# Patient Record
Sex: FEMALE | Race: ASIAN | Employment: UNEMPLOYED | URBAN - METROPOLITAN AREA
[De-identification: names, ages, dates, MRNs, and addresses within clinical notes are randomized per-mention and may not be internally consistent; named-entity substitution may affect disease eponyms.]

---

## 2017-03-15 ENCOUNTER — PATIENT MESSAGE (OUTPATIENT)
Dept: FAMILY MEDICINE CLINIC | Facility: CLINIC | Age: 32
End: 2017-03-15

## 2017-03-16 ENCOUNTER — APPOINTMENT (OUTPATIENT)
Dept: LAB | Age: 32
End: 2017-03-16
Attending: FAMILY MEDICINE
Payer: COMMERCIAL

## 2017-03-16 DIAGNOSIS — R89.9 ABNORMAL LABORATORY TEST RESULT: ICD-10-CM

## 2017-03-16 DIAGNOSIS — E03.9 HYPOTHYROIDISM, UNSPECIFIED TYPE: ICD-10-CM

## 2017-03-16 LAB
ALBUMIN SERPL-MCNC: 3.4 G/DL (ref 3.5–4.8)
ALP LIVER SERPL-CCNC: 91 U/L (ref 37–98)
ALT SERPL-CCNC: 21 U/L (ref 14–54)
AST SERPL-CCNC: 12 U/L (ref 15–41)
BILIRUB SERPL-MCNC: 0.3 MG/DL (ref 0.1–2)
BUN BLD-MCNC: 7 MG/DL (ref 8–20)
CALCIUM BLD-MCNC: 9.1 MG/DL (ref 8.3–10.3)
CHLORIDE: 106 MMOL/L (ref 101–111)
CO2: 26 MMOL/L (ref 22–32)
CREAT BLD-MCNC: 0.71 MG/DL (ref 0.55–1.02)
FREE T4: 1.4 NG/DL (ref 0.9–1.8)
GLUCOSE BLD-MCNC: 67 MG/DL (ref 70–99)
M PROTEIN MFR SERPL ELPH: 7.8 G/DL (ref 6.1–8.3)
POTASSIUM SERPL-SCNC: 3.5 MMOL/L (ref 3.6–5.1)
SODIUM SERPL-SCNC: 139 MMOL/L (ref 136–144)
TSI SER-ACNC: 0.16 MIU/ML (ref 0.35–5.5)

## 2017-03-16 PROCEDURE — 84439 ASSAY OF FREE THYROXINE: CPT

## 2017-03-16 PROCEDURE — 80053 COMPREHEN METABOLIC PANEL: CPT

## 2017-03-16 PROCEDURE — 36415 COLL VENOUS BLD VENIPUNCTURE: CPT

## 2017-03-16 PROCEDURE — 84443 ASSAY THYROID STIM HORMONE: CPT

## 2017-03-16 NOTE — TELEPHONE ENCOUNTER
From: Anna Slater  To: Julita Clayton MD  Sent: 3/15/2017 9:51 PM CDT  Subject: Prescription Question    Hi Doctor,  My prescription (levothyroxine) is due for refill next week. Should I be going for blood work?  [facility] k[facility]

## 2017-03-18 ENCOUNTER — PATIENT MESSAGE (OUTPATIENT)
Dept: FAMILY MEDICINE CLINIC | Facility: CLINIC | Age: 32
End: 2017-03-18

## 2017-03-18 DIAGNOSIS — E03.9 HYPOTHYROIDISM, UNSPECIFIED TYPE: Primary | ICD-10-CM

## 2017-03-18 DIAGNOSIS — Z79.899 ENCOUNTER FOR LONG-TERM (CURRENT) USE OF MEDICATIONS: ICD-10-CM

## 2017-03-20 RX ORDER — LEVOTHYROXINE SODIUM 88 UG/1
88 TABLET ORAL
Qty: 30 TABLET | Refills: 1 | Status: SHIPPED | OUTPATIENT
Start: 2017-03-20 | End: 2017-05-11

## 2017-03-20 NOTE — TELEPHONE ENCOUNTER
Ref Range 3/16/17 12:19 PM       Free T4 0.9-1.8 ng/dL 1.4     TSH 0.350-5.500 mIU/mL 0.156 (L)            Dr Benny Polk, adjustments needed?  Pt taking levothyroxine 100 mcg QD

## 2017-03-20 NOTE — TELEPHONE ENCOUNTER
From: Alisa Irwin  To: Pedro Meyers MD  Sent: 3/18/2017 10:00 AM CDT  Subject: Prescription Question    Hi, I ran out of levothyroxine today. My lab results are due only on Monday.  Will you please help with emergency prescription for two d

## 2017-05-10 ENCOUNTER — APPOINTMENT (OUTPATIENT)
Dept: LAB | Age: 32
End: 2017-05-10
Attending: FAMILY MEDICINE
Payer: COMMERCIAL

## 2017-05-10 DIAGNOSIS — Z79.899 ENCOUNTER FOR LONG-TERM (CURRENT) USE OF MEDICATIONS: ICD-10-CM

## 2017-05-10 DIAGNOSIS — E03.9 HYPOTHYROIDISM, UNSPECIFIED TYPE: ICD-10-CM

## 2017-05-10 PROCEDURE — 84443 ASSAY THYROID STIM HORMONE: CPT | Performed by: FAMILY MEDICINE

## 2017-05-10 PROCEDURE — 84439 ASSAY OF FREE THYROXINE: CPT | Performed by: FAMILY MEDICINE

## 2017-05-10 PROCEDURE — 36415 COLL VENOUS BLD VENIPUNCTURE: CPT | Performed by: FAMILY MEDICINE

## 2017-08-11 ENCOUNTER — PATIENT MESSAGE (OUTPATIENT)
Dept: FAMILY MEDICINE CLINIC | Facility: CLINIC | Age: 32
End: 2017-08-11

## 2017-08-11 NOTE — TELEPHONE ENCOUNTER
From: Sal Chou  To: Andrew Gaitan MD  Sent: 8/11/2017 5:31 AM CDT  Subject: Prescription Question    My prescription is running out in a week. Should I have to go for lab test first? Please advice. Thank you!

## 2017-11-06 ENCOUNTER — APPOINTMENT (OUTPATIENT)
Dept: LAB | Age: 32
End: 2017-11-06
Attending: FAMILY MEDICINE
Payer: COMMERCIAL

## 2017-11-06 DIAGNOSIS — E03.9 HYPOTHYROIDISM, UNSPECIFIED TYPE: ICD-10-CM

## 2017-11-06 PROCEDURE — 36415 COLL VENOUS BLD VENIPUNCTURE: CPT | Performed by: FAMILY MEDICINE

## 2017-11-06 PROCEDURE — 84443 ASSAY THYROID STIM HORMONE: CPT | Performed by: FAMILY MEDICINE

## 2017-11-06 PROCEDURE — 84439 ASSAY OF FREE THYROXINE: CPT | Performed by: FAMILY MEDICINE

## 2017-11-08 ENCOUNTER — OFFICE VISIT (OUTPATIENT)
Dept: FAMILY MEDICINE CLINIC | Facility: CLINIC | Age: 32
End: 2017-11-08

## 2017-11-08 VITALS
HEIGHT: 62.5 IN | DIASTOLIC BLOOD PRESSURE: 60 MMHG | BODY MASS INDEX: 26.96 KG/M2 | TEMPERATURE: 98 F | WEIGHT: 150.25 LBS | HEART RATE: 75 BPM | RESPIRATION RATE: 16 BRPM | SYSTOLIC BLOOD PRESSURE: 106 MMHG

## 2017-11-08 DIAGNOSIS — N92.1 MENORRHAGIA WITH IRREGULAR CYCLE: ICD-10-CM

## 2017-11-08 DIAGNOSIS — E03.9 HYPOTHYROIDISM, UNSPECIFIED TYPE: Primary | ICD-10-CM

## 2017-11-08 DIAGNOSIS — E28.2 PCOS (POLYCYSTIC OVARIAN SYNDROME): ICD-10-CM

## 2017-11-08 DIAGNOSIS — N92.6 IRREGULAR MENSTRUATION: ICD-10-CM

## 2017-11-08 PROCEDURE — 99213 OFFICE O/P EST LOW 20 MIN: CPT | Performed by: FAMILY MEDICINE

## 2017-11-08 PROCEDURE — 90471 IMMUNIZATION ADMIN: CPT | Performed by: FAMILY MEDICINE

## 2017-11-08 PROCEDURE — 90686 IIV4 VACC NO PRSV 0.5 ML IM: CPT | Performed by: FAMILY MEDICINE

## 2017-11-08 RX ORDER — LEVOTHYROXINE SODIUM 88 UG/1
88 TABLET ORAL
Qty: 90 TABLET | Refills: 3 | Status: SHIPPED | OUTPATIENT
Start: 2017-11-08 | End: 2018-12-11

## 2017-11-08 NOTE — PATIENT INSTRUCTIONS
Birth Control: The Pill    Birth control pills contain hormones that help prevent pregnancy. The pills are prescribed by your healthcare provider. There are many types of birth control pills available.  If you have side effects from one type of pill, tell In these cases, discuss the risks with your healthcare provider. Date Last Reviewed: 3/1/2017  © 1675-9113 [employer]. [address] [facility], [address] [place] [address]. All rights reserved.  This information is not intended as a substitute for prof

## 2017-11-08 NOTE — PROGRESS NOTES
Ap Cristobal is a 27year old female. Patient presents with:  Test Results: Discuss results. HPI:   Hypothyroidism: Patient is seen for follow-up and medication refill.   States did have her blood work done and would like to discuss her result auscultation  CARDIO: RRR without murmur  GI: good BS's,no masses, HSM or tenderness  EXTREMITIES: no cyanosis, clubbing or edema  NEURO: DTR 2+ and symmetrical bilateral    ASSESSMENT AND PLAN:   Sangita Emile was seen today for test results.     Diagnoses an

## 2018-12-11 DIAGNOSIS — E03.9 HYPOTHYROIDISM, UNSPECIFIED TYPE: ICD-10-CM

## 2018-12-12 ENCOUNTER — PATIENT MESSAGE (OUTPATIENT)
Dept: FAMILY MEDICINE CLINIC | Facility: CLINIC | Age: 33
End: 2018-12-12

## 2018-12-12 NOTE — TELEPHONE ENCOUNTER
From: Riri Khoury  To: Sina Pollard MD  Sent: 12/12/2018 2:23 PM CST  Subject: Prescription Question    Terrance Hensley requesting a refill for my prescription. I have an appointment for my annual Physical on Jan 08.    Before that I will run

## 2018-12-13 ENCOUNTER — PATIENT MESSAGE (OUTPATIENT)
Dept: FAMILY MEDICINE CLINIC | Facility: CLINIC | Age: 33
End: 2018-12-13

## 2018-12-13 RX ORDER — LEVOTHYROXINE SODIUM 88 UG/1
88 TABLET ORAL
Qty: 30 TABLET | Refills: 0 | Status: SHIPPED | OUTPATIENT
Start: 2018-12-13 | End: 2019-01-15

## 2018-12-13 NOTE — TELEPHONE ENCOUNTER
LOV 11/17    LAST LAB 11/17 TSH ordered.      LAST RX  Levothyroxine Sodium 88 MCG Oral Tab 90 tablet 3 11/8/2017       Next OV 1/8/2019    PROTOCOL    Thyroid Supplements Protocol [id number] 8:36 PM   TSH test in past 12 months    TSH value between 0.35

## 2018-12-13 NOTE — TELEPHONE ENCOUNTER
From: Ranjit Vasquez  To: Tanner Warren MD  Sent: 12/13/2018 5:21 PM CST  Subject: Prescription Question    Thank you Lia Dose!

## 2019-01-08 ENCOUNTER — OFFICE VISIT (OUTPATIENT)
Dept: FAMILY MEDICINE CLINIC | Facility: CLINIC | Age: 34
End: 2019-01-08
Payer: COMMERCIAL

## 2019-01-08 VITALS
SYSTOLIC BLOOD PRESSURE: 98 MMHG | HEIGHT: 62.5 IN | WEIGHT: 140.25 LBS | OXYGEN SATURATION: 98 % | TEMPERATURE: 98 F | RESPIRATION RATE: 14 BRPM | BODY MASS INDEX: 25.17 KG/M2 | DIASTOLIC BLOOD PRESSURE: 78 MMHG | HEART RATE: 86 BPM

## 2019-01-08 DIAGNOSIS — E03.9 HYPOTHYROIDISM, UNSPECIFIED TYPE: ICD-10-CM

## 2019-01-08 DIAGNOSIS — Z01.419 WELL WOMAN EXAM WITH ROUTINE GYNECOLOGICAL EXAM: ICD-10-CM

## 2019-01-08 DIAGNOSIS — Z00.00 ROUTINE GENERAL MEDICAL EXAMINATION AT A HEALTH CARE FACILITY: Primary | ICD-10-CM

## 2019-01-08 DIAGNOSIS — Z23 NEED FOR VACCINATION: ICD-10-CM

## 2019-01-08 PROBLEM — N92.1 MENORRHAGIA WITH IRREGULAR CYCLE: Status: RESOLVED | Noted: 2017-11-08 | Resolved: 2019-01-08

## 2019-01-08 PROCEDURE — 88175 CYTOPATH C/V AUTO FLUID REDO: CPT | Performed by: FAMILY MEDICINE

## 2019-01-08 PROCEDURE — 90686 IIV4 VACC NO PRSV 0.5 ML IM: CPT | Performed by: FAMILY MEDICINE

## 2019-01-08 PROCEDURE — 87624 HPV HI-RISK TYP POOLED RSLT: CPT | Performed by: FAMILY MEDICINE

## 2019-01-08 PROCEDURE — 99395 PREV VISIT EST AGE 18-39: CPT | Performed by: FAMILY MEDICINE

## 2019-01-08 PROCEDURE — 90471 IMMUNIZATION ADMIN: CPT | Performed by: FAMILY MEDICINE

## 2019-01-08 NOTE — PROGRESS NOTES
Dilan Montilla is a 26year old female here for Patient presents with: Well Adult: Physical and pap    HPI:   Patient is seen for her annual physical and pap  Would like to get the flu vaccine.     States has been eating healthy and exercising regu hemoptysis. Cardiovascular: No heart palpitations, irregular heartbeat, faintness or syncope, no chest pressure or chest pain on exertion. No edema or varicose veins. GI: No change in appetite, heartburn, diarrhea, constipation, or blood in stool.  No hem Back and extremities within normal limits  EXTREMITIES: no cyanosis, clubbing or edema.  Peripheral pulses normal.  NEURO: Nonfocal exam. Oriented times three,cranial nerves are intact,motor and sensory are grossly intact, speech normal, DTR's equal bilater

## 2019-01-09 LAB — HPV I/H RISK 1 DNA SPEC QL NAA+PROBE: NEGATIVE

## 2019-01-13 LAB
ABSOLUTE BASOPHILS: 53 CELLS/UL (ref 0–200)
ABSOLUTE EOSINOPHILS: 113 CELLS/UL (ref 15–500)
ABSOLUTE LYMPHOCYTES: 3300 CELLS/UL (ref 850–3900)
ABSOLUTE MONOCYTES: 473 CELLS/UL (ref 200–950)
ABSOLUTE NEUTROPHILS: 3563 CELLS/UL (ref 1500–7800)
ALBUMIN/GLOBULIN RATIO: 1.1 (CALC) (ref 1–2.5)
ALBUMIN: 3.9 G/DL (ref 3.6–5.1)
ALKALINE PHOSPHATASE: 92 U/L (ref 33–115)
ALT: 12 U/L (ref 6–29)
AST: 15 U/L (ref 10–30)
BASOPHILS: 0.7 %
BILIRUBIN, TOTAL: 0.3 MG/DL (ref 0.2–1.2)
BUN/CREATININE RATIO: 18 (CALC) (ref 6–22)
BUN: 9 MG/DL (ref 7–25)
CALCIUM: 9.4 MG/DL (ref 8.6–10.2)
CARBON DIOXIDE: 28 MMOL/L (ref 20–32)
CHLORIDE: 104 MMOL/L (ref 98–110)
CHOL/HDLC RATIO: 3.8 (CALC)
CHOLESTEROL, TOTAL: 192 MG/DL
CREATININE: 0.49 MG/DL (ref 0.5–1.1)
EGFR IF AFRICN AM: 146 ML/MIN/1.73M2
EGFR IF NONAFRICN AM: 126 ML/MIN/1.73M2
EOSINOPHILS: 1.5 %
GLOBULIN: 3.6 G/DL (CALC) (ref 1.9–3.7)
GLUCOSE: 94 MG/DL (ref 65–99)
HDL CHOLESTEROL: 51 MG/DL
HEMATOCRIT: 37.4 % (ref 35–45)
HEMOGLOBIN: 11.8 G/DL (ref 11.7–15.5)
LDL-CHOLESTEROL: 127 MG/DL (CALC)
LYMPHOCYTES: 44 %
MCH: 25.8 PG (ref 27–33)
MCHC: 31.6 G/DL (ref 32–36)
MCV: 81.7 FL (ref 80–100)
MONOCYTES: 6.3 %
MPV: 10.2 FL (ref 7.5–12.5)
NEUTROPHILS: 47.5 %
NON-HDL CHOLESTEROL: 141 MG/DL (CALC)
PLATELET COUNT: 362 THOUSAND/UL (ref 140–400)
POTASSIUM: 4.2 MMOL/L (ref 3.5–5.3)
PROTEIN, TOTAL: 7.5 G/DL (ref 6.1–8.1)
RDW: 12.8 % (ref 11–15)
RED BLOOD CELL COUNT: 4.58 MILLION/UL (ref 3.8–5.1)
SODIUM: 139 MMOL/L (ref 135–146)
TRIGLYCERIDES: 57 MG/DL
TSH W/REFLEX TO FT4: 1.49 MIU/L
WHITE BLOOD CELL COUNT: 7.5 THOUSAND/UL (ref 3.8–10.8)

## 2020-01-13 DIAGNOSIS — E03.9 HYPOTHYROIDISM, UNSPECIFIED TYPE: ICD-10-CM

## 2020-01-13 RX ORDER — LEVOTHYROXINE SODIUM 88 UG/1
88 TABLET ORAL
Qty: 90 TABLET | Refills: 3 | Status: CANCELLED | OUTPATIENT
Start: 2020-01-13 | End: 2020-04-12

## 2020-01-14 RX ORDER — LEVOTHYROXINE SODIUM 88 UG/1
TABLET ORAL
Qty: 90 TABLET | Refills: 2 | OUTPATIENT
Start: 2020-01-14

## 2020-01-23 ENCOUNTER — OFFICE VISIT (OUTPATIENT)
Dept: FAMILY MEDICINE CLINIC | Facility: CLINIC | Age: 35
End: 2020-01-23
Payer: COMMERCIAL

## 2020-01-23 VITALS
SYSTOLIC BLOOD PRESSURE: 96 MMHG | OXYGEN SATURATION: 98 % | HEART RATE: 86 BPM | WEIGHT: 141 LBS | BODY MASS INDEX: 25.3 KG/M2 | DIASTOLIC BLOOD PRESSURE: 62 MMHG | TEMPERATURE: 98 F | RESPIRATION RATE: 18 BRPM | HEIGHT: 62.6 IN

## 2020-01-23 DIAGNOSIS — E03.9 HYPOTHYROIDISM, UNSPECIFIED TYPE: ICD-10-CM

## 2020-01-23 DIAGNOSIS — Z00.00 ROUTINE GENERAL MEDICAL EXAMINATION AT A HEALTH CARE FACILITY: Primary | ICD-10-CM

## 2020-01-23 PROCEDURE — 99395 PREV VISIT EST AGE 18-39: CPT | Performed by: FAMILY MEDICINE

## 2020-01-23 NOTE — PATIENT INSTRUCTIONS
Will refill Levothyroxine after I review your labs. Prevention Guidelines, Women Ages 25 to 44  Screening tests and vaccines are an important part of managing your health.  A screening test is done to find possible disorders or diseases in people who don prediabetes All women diagnosed with gestational diabetes Lifelong testing every 3 years   Type 2 diabetes All women with prediabetes Every year   Gonorrhea Sexually active women at increased risk for infection At routine exams   Hepatitis C Anyone at incr infections or vaccines 1 or 2 doses   Meningococcal Women at increased risk for infection should talk with their healthcare provider 1 or more doses   Pneumococcal conjugate vaccine (PCV13) and pneumococcal polysaccharide vaccine (PPSV23) Women at increase instructions.

## 2020-01-23 NOTE — PROGRESS NOTES
Rosalinda Armstrong is a 37year old female here for Patient presents with:  Physical    HPI:   Patient is seen for her annual physical.  Pap done last year was normal.  Does not do breast self exam, no family history of breast cancer.     Hypothyroidism shortness of breath. Cardiovascular: Negative for chest pain and palpitations. Gastrointestinal: Negative for nausea, abdominal pain, constipation and blood in stool. Endocrine: Negative for cold intolerance, heat intolerance and polyuria.    Genitou noted.   Psychiatric: She has a normal mood and affect.  Her behavior is normal. Judgment and thought content normal.     ASSESSMENT AND PLAN:   Andie Morales was seen today for physical.    Diagnoses and all orders for this visit:    Routine general medical e

## 2020-01-26 LAB
ABSOLUTE BASOPHILS: 48 CELLS/UL (ref 0–200)
ABSOLUTE EOSINOPHILS: 90 CELLS/UL (ref 15–500)
ABSOLUTE LYMPHOCYTES: 2836 CELLS/UL (ref 850–3900)
ABSOLUTE MONOCYTES: 511 CELLS/UL (ref 200–950)
ABSOLUTE NEUTROPHILS: 3416 CELLS/UL (ref 1500–7800)
ALBUMIN/GLOBULIN RATIO: 1.2 (CALC) (ref 1–2.5)
ALBUMIN: 4 G/DL (ref 3.6–5.1)
ALKALINE PHOSPHATASE: 98 U/L (ref 33–115)
ALT: 12 U/L (ref 6–29)
AST: 16 U/L (ref 10–30)
BASOPHILS: 0.7 %
BILIRUBIN, TOTAL: 0.3 MG/DL (ref 0.2–1.2)
BUN: 8 MG/DL (ref 7–25)
CALCIUM: 9.5 MG/DL (ref 8.6–10.2)
CARBON DIOXIDE: 26 MMOL/L (ref 20–32)
CHLORIDE: 105 MMOL/L (ref 98–110)
CHOL/HDLC RATIO: 3.5 (CALC)
CHOLESTEROL, TOTAL: 193 MG/DL
CREATININE: 0.58 MG/DL (ref 0.5–1.1)
EGFR IF AFRICN AM: 137 ML/MIN/1.73M2
EGFR IF NONAFRICN AM: 119 ML/MIN/1.73M2
EOSINOPHILS: 1.3 %
GLOBULIN: 3.4 G/DL (CALC) (ref 1.9–3.7)
GLUCOSE: 100 MG/DL (ref 65–99)
HDL CHOLESTEROL: 55 MG/DL
HEMATOCRIT: 37.5 % (ref 35–45)
HEMOGLOBIN: 11.9 G/DL (ref 11.7–15.5)
LDL-CHOLESTEROL: 122 MG/DL (CALC)
LYMPHOCYTES: 41.1 %
MCH: 26.3 PG (ref 27–33)
MCHC: 31.7 G/DL (ref 32–36)
MCV: 82.8 FL (ref 80–100)
MONOCYTES: 7.4 %
MPV: 9.7 FL (ref 7.5–12.5)
NEUTROPHILS: 49.5 %
NON-HDL CHOLESTEROL: 138 MG/DL (CALC)
PLATELET COUNT: 372 THOUSAND/UL (ref 140–400)
POTASSIUM: 4.1 MMOL/L (ref 3.5–5.3)
PROTEIN, TOTAL: 7.4 G/DL (ref 6.1–8.1)
RDW: 12.5 % (ref 11–15)
RED BLOOD CELL COUNT: 4.53 MILLION/UL (ref 3.8–5.1)
SODIUM: 139 MMOL/L (ref 135–146)
T4, FREE: 1.2 NG/DL (ref 0.8–1.8)
TRIGLYCERIDES: 65 MG/DL
TSH: 3.01 MIU/L
WHITE BLOOD CELL COUNT: 6.9 THOUSAND/UL (ref 3.8–10.8)

## 2020-01-27 ENCOUNTER — PATIENT MESSAGE (OUTPATIENT)
Dept: FAMILY MEDICINE CLINIC | Facility: CLINIC | Age: 35
End: 2020-01-27

## 2020-01-27 DIAGNOSIS — E03.9 HYPOTHYROIDISM, UNSPECIFIED TYPE: Primary | ICD-10-CM

## 2020-01-27 RX ORDER — LEVOTHYROXINE SODIUM 0.1 MG/1
100 TABLET ORAL DAILY
Qty: 60 TABLET | Refills: 0 | Status: SHIPPED | OUTPATIENT
Start: 2020-01-27 | End: 2020-03-17

## 2020-01-27 NOTE — TELEPHONE ENCOUNTER
From: Nelida Berkowitz  To: Charan Johnston MD  Sent: 1/27/2020 2:59 PM CST  Subject: Prescription Question    Hi,  Hope you got my test results. I have only two pills ( levothyroxine 88mcg) left.   Kindly requesting you to send a refill and also

## 2020-03-15 LAB
T4, FREE: 1.1 NG/DL (ref 0.8–1.8)
TSH: 1.11 MIU/L

## 2020-03-17 DIAGNOSIS — E03.9 HYPOTHYROIDISM, UNSPECIFIED TYPE: ICD-10-CM

## 2020-03-17 RX ORDER — LEVOTHYROXINE SODIUM 0.1 MG/1
100 TABLET ORAL DAILY
Qty: 90 TABLET | Refills: 1 | Status: SHIPPED | OUTPATIENT
Start: 2020-03-17 | End: 2020-09-17

## 2020-09-16 DIAGNOSIS — E03.9 HYPOTHYROIDISM, UNSPECIFIED TYPE: ICD-10-CM

## 2020-09-17 RX ORDER — LEVOTHYROXINE SODIUM 0.1 MG/1
TABLET ORAL
Qty: 90 TABLET | Refills: 0 | Status: SHIPPED | OUTPATIENT
Start: 2020-09-17 | End: 2020-12-18

## 2020-09-17 NOTE — TELEPHONE ENCOUNTER
LOV 1/23/2020    LAST LAB 3-14-20    LAST RX 3-17-20 90*1    Next OV No future appointments.     PROTOCOL    Name from pharmacy: Levothyroxine Sodium Oral Tablet 100 MCG          Will file in chart as: LEVOTHYROXINE SODIUM 100 MCG Oral Tab         Sig: TAKE

## 2020-12-17 DIAGNOSIS — E03.9 HYPOTHYROIDISM, UNSPECIFIED TYPE: ICD-10-CM

## 2020-12-18 RX ORDER — LEVOTHYROXINE SODIUM 0.1 MG/1
TABLET ORAL
Qty: 90 TABLET | Refills: 0 | Status: SHIPPED | OUTPATIENT
Start: 2020-12-18 | End: 2021-03-23

## 2020-12-18 NOTE — TELEPHONE ENCOUNTER
LOV 1/23/2020    LAST LAB 3/14/2020    LAST RX    LEVOTHYROXINE SODIUM 100 MCG Oral Tab 90 tablet 0 9/17/2020         Next OV No future appointments. PROTOCOL passed.

## 2021-01-05 ENCOUNTER — TELEMEDICINE (OUTPATIENT)
Dept: FAMILY MEDICINE CLINIC | Facility: CLINIC | Age: 36
End: 2021-01-05
Payer: COMMERCIAL

## 2021-01-05 DIAGNOSIS — R09.81 NASAL CONGESTION: ICD-10-CM

## 2021-01-05 DIAGNOSIS — R51.9 HEADACHE IN FRONT OF HEAD: Primary | ICD-10-CM

## 2021-01-05 DIAGNOSIS — E03.9 HYPOTHYROIDISM, UNSPECIFIED TYPE: ICD-10-CM

## 2021-01-05 DIAGNOSIS — H53.9 VISION CHANGES: ICD-10-CM

## 2021-01-05 PROCEDURE — 99213 OFFICE O/P EST LOW 20 MIN: CPT | Performed by: FAMILY MEDICINE

## 2021-01-05 NOTE — PROGRESS NOTES
Anali Fuentes is a 38year old female. Patient presents with:  Headache    This visit is conducted using telemedicine with live interactive video and audio. Anali Fuentes verbally consents to virtual video visit.    Patient understands a 3    ASSESSMENT AND PLAN:   Chito Gann was seen today for headache. Diagnoses and all orders for this visit:    Headache in front of head  Encourage patient to continue to monitor her symptoms and keep a headache diary.   Advised can take Tylenol as need

## 2021-03-23 ENCOUNTER — PATIENT MESSAGE (OUTPATIENT)
Dept: FAMILY MEDICINE CLINIC | Facility: CLINIC | Age: 36
End: 2021-03-23

## 2021-03-23 DIAGNOSIS — E03.9 HYPOTHYROIDISM, UNSPECIFIED TYPE: ICD-10-CM

## 2021-03-23 RX ORDER — LEVOTHYROXINE SODIUM 0.1 MG/1
TABLET ORAL
Qty: 30 TABLET | Refills: 0 | Status: SHIPPED | OUTPATIENT
Start: 2021-03-23 | End: 2021-04-13

## 2021-03-23 NOTE — TELEPHONE ENCOUNTER
LOV 1-5-21    LAST LAB 3-14-20    LAST RX 12-18-20 90*0     Next OV No future appointments.     PROTOCOL  Name from pharmacy: Levothyroxine Sodium Oral Tablet 100 MCG          Will file in chart as: LEVOTHYROXINE SODIUM 100 MCG Oral Tab    Sig: TAKE ONE TAB

## 2021-03-24 NOTE — TELEPHONE ENCOUNTER
From: Jesse Her  To: Kaylee Kwon MD  Sent: 3/23/2021 7:24 AM CDT  Subject: Prescription Question    Hello Ma'am   I realize that I am running out of my levothyroxine pills. Checked the pharmacy and they don't have any more refills.  I hav

## 2021-05-19 ENCOUNTER — OFFICE VISIT (OUTPATIENT)
Dept: FAMILY MEDICINE CLINIC | Facility: CLINIC | Age: 36
End: 2021-05-19
Payer: COMMERCIAL

## 2021-05-19 VITALS
HEART RATE: 110 BPM | HEIGHT: 62.6 IN | SYSTOLIC BLOOD PRESSURE: 102 MMHG | WEIGHT: 148 LBS | OXYGEN SATURATION: 100 % | TEMPERATURE: 97 F | RESPIRATION RATE: 18 BRPM | BODY MASS INDEX: 26.55 KG/M2 | DIASTOLIC BLOOD PRESSURE: 64 MMHG

## 2021-05-19 DIAGNOSIS — E55.9 VITAMIN D DEFICIENCY: ICD-10-CM

## 2021-05-19 DIAGNOSIS — Z00.00 ROUTINE GENERAL MEDICAL EXAMINATION AT A HEALTH CARE FACILITY: Primary | ICD-10-CM

## 2021-05-19 DIAGNOSIS — L65.9 HAIR LOSS: ICD-10-CM

## 2021-05-19 PROCEDURE — 3008F BODY MASS INDEX DOCD: CPT | Performed by: FAMILY MEDICINE

## 2021-05-19 PROCEDURE — 3078F DIAST BP <80 MM HG: CPT | Performed by: FAMILY MEDICINE

## 2021-05-19 PROCEDURE — 3074F SYST BP LT 130 MM HG: CPT | Performed by: FAMILY MEDICINE

## 2021-05-19 PROCEDURE — 99395 PREV VISIT EST AGE 18-39: CPT | Performed by: FAMILY MEDICINE

## 2021-05-19 NOTE — PROGRESS NOTES
Rocio Belle is a 38year old female. Patient presents with:  Physical    HPI:   Rocio Belle is a 38year old female with history of hypothyroidism seen for her annual physical.  Pap is up to date.   Does do breast self exam, no family Negative for arthralgias, gait problem, joint swelling and myalgias. Skin: Negative for rash. Allergic/Immunologic: Negative for food allergies. Neurological: Negative for dizziness, weakness, numbness and headaches.    Hematological: Negative for ronnie Lymphadenopathy:      Cervical: No cervical adenopathy. Skin:     General: Skin is warm. Findings: No rash. Neurological:      Mental Status: She is alert and oriented to person, place, and time.       Deep Tendon Reflexes: Reflexes are normal an

## 2022-01-11 DIAGNOSIS — E03.9 HYPOTHYROIDISM, UNSPECIFIED TYPE: ICD-10-CM

## 2022-01-11 RX ORDER — LEVOTHYROXINE SODIUM 0.1 MG/1
TABLET ORAL
Qty: 60 TABLET | Refills: 0 | Status: SHIPPED | OUTPATIENT
Start: 2022-01-11

## 2022-01-11 NOTE — TELEPHONE ENCOUNTER
LOV 5/19/2021    LAST LAB 4-10-21    LAST RX 4-13-21 90*2    Next OV No future appointments.     PROTOCOL  Name from pharmacy: Levothyroxine Sodium Oral Tablet 100 MCG          Will file in chart as: LEVOTHYROXINE 100 MCG Oral Tab    Sig: TAKE ONE TABLET BY

## 2022-03-15 RX ORDER — LEVOTHYROXINE SODIUM 0.1 MG/1
TABLET ORAL
Qty: 30 TABLET | Refills: 0 | Status: SHIPPED | OUTPATIENT
Start: 2022-03-15

## 2022-03-15 NOTE — TELEPHONE ENCOUNTER
LOV 5/19/2021      LAST LAB 8/26/21    LAST RX   LEVOTHYROXINE 100 MCG Oral Tab 60 tablet 0 1/11/2022         Next OV No future appointments.       PROTOCOL PASS

## 2022-04-13 ENCOUNTER — PATIENT MESSAGE (OUTPATIENT)
Dept: FAMILY MEDICINE CLINIC | Facility: CLINIC | Age: 37
End: 2022-04-13

## 2022-04-13 NOTE — TELEPHONE ENCOUNTER
From: Kang Mosquera  To: Mae Haddad MD  Sent: 4/13/2022 10:00 AM CDT  Subject: Prescription Refill     Lois Rodriguez,  My appointment is scheduled at June 09th. However we have our [facility] trip planned from June 1st to July 09th. My current stock of pills will runout in 5 days. It will be helpful if you give my prescription for 4 months ( until July end). Also appreciate if my appointment can be changed before(preferable)or after(July 09)my travel. Please let me know. Thank you!

## 2022-04-14 RX ORDER — LEVOTHYROXINE SODIUM 0.1 MG/1
TABLET ORAL
Qty: 30 TABLET | Refills: 0 | Status: SHIPPED | OUTPATIENT
Start: 2022-04-14

## 2022-04-14 NOTE — TELEPHONE ENCOUNTER
Thyroid Supplements Protocol Failed 04/13/2022 02:38 PM   Protocol Details  TSH test in past 12 months    TSH value between 0.350 and 5.500 IU/ml    Appointment in past 12 or next 3 months     LOV 5/19/21     LAST LAB  4/10/21      LAST RX 3/15/22 30 tabs     Next OV   Future Appointments   Date Time Provider Carlie Garner   5/11/2022  8:00 AM Yolanda Taylor MD [facility]         PROTOCOL failed

## 2022-05-11 ENCOUNTER — OFFICE VISIT (OUTPATIENT)
Dept: FAMILY MEDICINE CLINIC | Facility: CLINIC | Age: 37
End: 2022-05-11
Payer: COMMERCIAL

## 2022-05-11 VITALS
TEMPERATURE: 98 F | RESPIRATION RATE: 18 BRPM | DIASTOLIC BLOOD PRESSURE: 80 MMHG | SYSTOLIC BLOOD PRESSURE: 110 MMHG | HEART RATE: 83 BPM | OXYGEN SATURATION: 100 % | WEIGHT: 142 LBS | BODY MASS INDEX: 25.48 KG/M2 | HEIGHT: 62.6 IN

## 2022-05-11 DIAGNOSIS — E55.9 VITAMIN D DEFICIENCY: ICD-10-CM

## 2022-05-11 DIAGNOSIS — E03.9 HYPOTHYROIDISM, UNSPECIFIED TYPE: ICD-10-CM

## 2022-05-11 DIAGNOSIS — Z01.419 ENCOUNTER FOR ROUTINE GYNECOLOGICAL EXAMINATION WITH PAPANICOLAOU SMEAR OF CERVIX: ICD-10-CM

## 2022-05-11 DIAGNOSIS — Z00.00 ROUTINE GENERAL MEDICAL EXAMINATION AT A HEALTH CARE FACILITY: Primary | ICD-10-CM

## 2022-05-11 DIAGNOSIS — G44.89 OTHER HEADACHE SYNDROME: ICD-10-CM

## 2022-05-11 PROCEDURE — 99395 PREV VISIT EST AGE 18-39: CPT | Performed by: FAMILY MEDICINE

## 2022-05-11 PROCEDURE — 3008F BODY MASS INDEX DOCD: CPT | Performed by: FAMILY MEDICINE

## 2022-05-11 PROCEDURE — 87624 HPV HI-RISK TYP POOLED RSLT: CPT | Performed by: FAMILY MEDICINE

## 2022-05-11 PROCEDURE — 88175 CYTOPATH C/V AUTO FLUID REDO: CPT | Performed by: FAMILY MEDICINE

## 2022-05-11 PROCEDURE — 3074F SYST BP LT 130 MM HG: CPT | Performed by: FAMILY MEDICINE

## 2022-05-11 PROCEDURE — 3079F DIAST BP 80-89 MM HG: CPT | Performed by: FAMILY MEDICINE

## 2022-05-12 LAB — HPV I/H RISK 1 DNA SPEC QL NAA+PROBE: NEGATIVE

## 2022-05-15 LAB
ABSOLUTE BASOPHILS: 47 CELLS/UL (ref 0–200)
ABSOLUTE EOSINOPHILS: 109 CELLS/UL (ref 15–500)
ABSOLUTE LYMPHOCYTES: 3362 CELLS/UL (ref 850–3900)
ABSOLUTE MONOCYTES: 585 CELLS/UL (ref 200–950)
ABSOLUTE NEUTROPHILS: 3697 CELLS/UL (ref 1500–7800)
ALBUMIN/GLOBULIN RATIO: 1.1 (CALC) (ref 1–2.5)
ALBUMIN: 3.9 G/DL (ref 3.6–5.1)
ALKALINE PHOSPHATASE: 88 U/L (ref 31–125)
ALT: 15 U/L (ref 6–29)
AST: 14 U/L (ref 10–30)
BASOPHILS: 0.6 %
BILIRUBIN, TOTAL: 0.3 MG/DL (ref 0.2–1.2)
BUN/CREATININE RATIO: 11 (CALC) (ref 6–22)
BUN: 5 MG/DL (ref 7–25)
CALCIUM: 9.5 MG/DL (ref 8.6–10.2)
CARBON DIOXIDE: 27 MMOL/L (ref 20–32)
CHLORIDE: 103 MMOL/L (ref 98–110)
CHOL/HDLC RATIO: 3.8 (CALC)
CHOLESTEROL, TOTAL: 204 MG/DL
CREATININE: 0.47 MG/DL (ref 0.5–1.1)
EGFR IF AFRICN AM: 145 ML/MIN/1.73M2
EGFR IF NONAFRICN AM: 125 ML/MIN/1.73M2
EOSINOPHILS: 1.4 %
GLOBULIN: 3.4 G/DL (CALC) (ref 1.9–3.7)
GLUCOSE: 90 MG/DL (ref 65–99)
HDL CHOLESTEROL: 53 MG/DL
HEMATOCRIT: 37.3 % (ref 35–45)
HEMOGLOBIN: 11.9 G/DL (ref 11.7–15.5)
LDL-CHOLESTEROL: 129 MG/DL (CALC)
LYMPHOCYTES: 43.1 %
MCH: 26.4 PG (ref 27–33)
MCHC: 31.9 G/DL (ref 32–36)
MCV: 82.7 FL (ref 80–100)
MONOCYTES: 7.5 %
MPV: 9.4 FL (ref 7.5–12.5)
NEUTROPHILS: 47.4 %
NON-HDL CHOLESTEROL: 151 MG/DL (CALC)
PLATELET COUNT: 343 THOUSAND/UL (ref 140–400)
POTASSIUM: 4.3 MMOL/L (ref 3.5–5.3)
PROTEIN, TOTAL: 7.3 G/DL (ref 6.1–8.1)
RDW: 12.6 % (ref 11–15)
RED BLOOD CELL COUNT: 4.51 MILLION/UL (ref 3.8–5.1)
SODIUM: 137 MMOL/L (ref 135–146)
T4, FREE: 1.4 NG/DL (ref 0.8–1.8)
TRIGLYCERIDES: 116 MG/DL
TSH: 0.34 MIU/L
VITAMIN D, 25-OH, TOTAL: 32 NG/ML (ref 30–100)
WHITE BLOOD CELL COUNT: 7.8 THOUSAND/UL (ref 3.8–10.8)

## 2022-05-16 DIAGNOSIS — E03.9 HYPOTHYROIDISM, UNSPECIFIED TYPE: ICD-10-CM

## 2022-05-17 DIAGNOSIS — E03.9 HYPOTHYROIDISM, UNSPECIFIED TYPE: ICD-10-CM

## 2022-05-17 RX ORDER — LEVOTHYROXINE SODIUM 0.1 MG/1
TABLET ORAL
Qty: 30 TABLET | Refills: 0 | OUTPATIENT
Start: 2022-05-17

## 2022-05-18 RX ORDER — LEVOTHYROXINE SODIUM 0.07 MG/1
75 TABLET ORAL
Qty: 90 TABLET | Refills: 0 | OUTPATIENT
Start: 2022-05-18

## 2022-05-18 NOTE — TELEPHONE ENCOUNTER
LOV    LAST LAB    LAST RX   levothyroxine 75 MCG Oral Tab 90 tablet 0 5/16/2022     Sig - Route: Take 1 tablet (75 mcg total) by mouth           Next OV No future appointments. PROTOCOL    Thyroid Supplements Protocol Failed 05/17/2022 10:31 AM   Protocol Details  TSH value between 0.350 and 5.500 IU/ml    TSH test in past 12 months    Appointment in past 12 or next 3 months        Rx refilled on 5/16/22 90 tab with 0 refill.  DENIED AS DUPLICATE, INSTRUCTIONS TO PHARMACY TO CHECK FOR NEW/ REFILLS

## 2022-08-12 NOTE — TELEPHONE ENCOUNTER
Refilled #30, please remind patient to recheck labs for further refills as her dose was decreased in May.

## 2022-09-07 DIAGNOSIS — E03.9 HYPOTHYROIDISM, UNSPECIFIED TYPE: ICD-10-CM

## 2022-09-08 RX ORDER — LEVOTHYROXINE SODIUM 0.07 MG/1
TABLET ORAL
Qty: 30 TABLET | Refills: 0 | Status: SHIPPED | OUTPATIENT
Start: 2022-09-08

## 2022-09-09 DIAGNOSIS — E03.9 HYPOTHYROIDISM, UNSPECIFIED TYPE: ICD-10-CM

## 2022-09-09 RX ORDER — LEVOTHYROXINE SODIUM 0.07 MG/1
TABLET ORAL
Qty: 30 TABLET | Refills: 0 | OUTPATIENT
Start: 2022-09-09

## 2022-09-10 DIAGNOSIS — E03.9 HYPOTHYROIDISM, UNSPECIFIED TYPE: ICD-10-CM

## 2022-09-10 RX ORDER — LEVOTHYROXINE SODIUM 0.07 MG/1
TABLET ORAL
Qty: 30 TABLET | Refills: 0 | OUTPATIENT
Start: 2022-09-10

## 2022-09-12 RX ORDER — LEVOTHYROXINE SODIUM 0.07 MG/1
TABLET ORAL
Qty: 30 TABLET | Refills: 0 | OUTPATIENT
Start: 2022-09-12

## 2022-10-11 DIAGNOSIS — E03.9 HYPOTHYROIDISM, UNSPECIFIED TYPE: ICD-10-CM

## 2022-10-12 RX ORDER — LEVOTHYROXINE SODIUM 0.07 MG/1
TABLET ORAL
Qty: 15 TABLET | Refills: 0 | Status: SHIPPED | OUTPATIENT
Start: 2022-10-12

## 2022-10-12 NOTE — TELEPHONE ENCOUNTER
Refilled #15, dose adjusted in May, patient was advised to recheck labs in 6 weeks, please remind patient to get her labs done for further refills.

## 2022-10-18 DIAGNOSIS — E03.9 HYPOTHYROIDISM, UNSPECIFIED TYPE: Primary | ICD-10-CM

## 2022-10-18 RX ORDER — LEVOTHYROXINE SODIUM 0.1 MG/1
100 TABLET ORAL
Qty: 60 TABLET | Refills: 0 | Status: SHIPPED | OUTPATIENT
Start: 2022-10-18

## 2022-11-22 LAB — TSH W/REFLEX TO FT4: 2.9 MIU/L

## 2022-11-28 DIAGNOSIS — E03.9 HYPOTHYROIDISM, UNSPECIFIED TYPE: Primary | ICD-10-CM

## 2022-11-28 RX ORDER — LEVOTHYROXINE SODIUM 0.1 MG/1
100 TABLET ORAL
Qty: 90 TABLET | Refills: 1 | Status: SHIPPED | OUTPATIENT
Start: 2022-11-28

## 2023-06-02 ENCOUNTER — TELEPHONE (OUTPATIENT)
Dept: FAMILY MEDICINE CLINIC | Facility: CLINIC | Age: 38
End: 2023-06-02

## 2023-06-02 DIAGNOSIS — E03.9 HYPOTHYROIDISM, UNSPECIFIED TYPE: Primary | ICD-10-CM

## 2023-06-02 NOTE — TELEPHONE ENCOUNTER
Pt calling to schedule annual pe. Has active labs but [facility] expires after 6 months.  Can we re-order and let pt know

## 2023-06-02 NOTE — TELEPHONE ENCOUNTER
[facility] orders for six month repeat TSH and Free T4  last month. Both labs reordered for [facility]. Patient notified.

## 2023-06-07 DIAGNOSIS — E03.9 HYPOTHYROIDISM, UNSPECIFIED TYPE: ICD-10-CM

## 2023-06-07 RX ORDER — LEVOTHYROXINE SODIUM 0.1 MG/1
TABLET ORAL
Qty: 30 TABLET | Refills: 0 | Status: SHIPPED | OUTPATIENT
Start: 2023-06-07

## 2023-06-11 LAB
T4, FREE: 1.3 NG/DL (ref 0.8–1.8)
TSH: 0.81 MIU/L

## 2023-08-01 ENCOUNTER — OFFICE VISIT (OUTPATIENT)
Dept: FAMILY MEDICINE CLINIC | Facility: CLINIC | Age: 38
End: 2023-08-01
Payer: COMMERCIAL

## 2023-08-01 VITALS
TEMPERATURE: 97 F | BODY MASS INDEX: 26.02 KG/M2 | OXYGEN SATURATION: 98 % | SYSTOLIC BLOOD PRESSURE: 110 MMHG | DIASTOLIC BLOOD PRESSURE: 62 MMHG | WEIGHT: 145 LBS | HEIGHT: 62.6 IN | RESPIRATION RATE: 18 BRPM | HEART RATE: 70 BPM

## 2023-08-01 DIAGNOSIS — Z00.00 ROUTINE GENERAL MEDICAL EXAMINATION AT A HEALTH CARE FACILITY: Primary | ICD-10-CM

## 2023-08-01 DIAGNOSIS — Z12.31 ENCOUNTER FOR SCREENING MAMMOGRAM FOR MALIGNANT NEOPLASM OF BREAST: ICD-10-CM

## 2023-08-01 DIAGNOSIS — E03.9 HYPOTHYROIDISM, UNSPECIFIED TYPE: ICD-10-CM

## 2023-08-01 DIAGNOSIS — N92.0 MENORRHAGIA WITH REGULAR CYCLE: ICD-10-CM

## 2023-08-01 DIAGNOSIS — E55.9 VITAMIN D DEFICIENCY: ICD-10-CM

## 2023-08-01 PROCEDURE — 3074F SYST BP LT 130 MM HG: CPT | Performed by: FAMILY MEDICINE

## 2023-08-01 PROCEDURE — 99396 PREV VISIT EST AGE 40-64: CPT | Performed by: FAMILY MEDICINE

## 2023-08-01 PROCEDURE — 3078F DIAST BP <80 MM HG: CPT | Performed by: FAMILY MEDICINE

## 2023-08-01 PROCEDURE — 3008F BODY MASS INDEX DOCD: CPT | Performed by: FAMILY MEDICINE

## 2023-08-08 ENCOUNTER — HOSPITAL ENCOUNTER (OUTPATIENT)
Dept: MAMMOGRAPHY | Age: 38
Discharge: HOME | End: 2023-08-08
Attending: FAMILY MEDICINE
Payer: COMMERCIAL

## 2023-08-08 DIAGNOSIS — Z12.31 ENCOUNTER FOR SCREENING MAMMOGRAM FOR MALIGNANT NEOPLASM OF BREAST: ICD-10-CM

## 2023-08-08 PROCEDURE — 77063 BREAST TOMOSYNTHESIS BI: CPT | Performed by: FAMILY MEDICINE

## 2023-08-08 PROCEDURE — 77067 SCR MAMMO BI INCL CAD: CPT | Performed by: FAMILY MEDICINE

## 2023-08-16 LAB
% SATURATION: 20 % (CALC) (ref 16–45)
ABSOLUTE BASOPHILS: 30 CELLS/UL (ref 0–200)
ABSOLUTE EOSINOPHILS: 112 CELLS/UL (ref 15–500)
ABSOLUTE LYMPHOCYTES: 3050 CELLS/UL (ref 850–3900)
ABSOLUTE MONOCYTES: 372 CELLS/UL (ref 200–950)
ABSOLUTE NEUTROPHILS: 2336 CELLS/UL (ref 1500–7800)
ALBUMIN/GLOBULIN RATIO: 1.2 (CALC) (ref 1–2.5)
ALBUMIN: 4 G/DL (ref 3.6–5.1)
ALKALINE PHOSPHATASE: 90 U/L (ref 31–125)
ALT: 15 U/L (ref 6–29)
AST: 16 U/L (ref 10–30)
BASOPHILS: 0.5 %
BILIRUBIN, TOTAL: 0.3 MG/DL (ref 0.2–1.2)
BUN: 7 MG/DL (ref 7–25)
CALCIUM: 9.2 MG/DL (ref 8.6–10.2)
CARBON DIOXIDE: 26 MMOL/L (ref 20–32)
CHLORIDE: 105 MMOL/L (ref 98–110)
CHOL/HDLC RATIO: 3.8 (CALC)
CHOLESTEROL, TOTAL: 199 MG/DL
CREATININE: 0.53 MG/DL (ref 0.5–0.99)
EGFR: 120 ML/MIN/1.73M2
EOSINOPHILS: 1.9 %
FERRITIN: 27 NG/ML (ref 16–154)
GLOBULIN: 3.3 G/DL (CALC) (ref 1.9–3.7)
GLUCOSE: 94 MG/DL (ref 65–99)
HDL CHOLESTEROL: 52 MG/DL
HEMATOCRIT: 38.6 % (ref 35–45)
HEMOGLOBIN: 12.1 G/DL (ref 11.7–15.5)
IRON BINDING CAPACITY: 342 MCG/DL (CALC) (ref 250–450)
IRON, TOTAL: 68 MCG/DL (ref 40–190)
LDL-CHOLESTEROL: 128 MG/DL (CALC)
LYMPHOCYTES: 51.7 %
MCH: 26.6 PG (ref 27–33)
MCHC: 31.3 G/DL (ref 32–36)
MCV: 84.8 FL (ref 80–100)
MONOCYTES: 6.3 %
MPV: 10 FL (ref 7.5–12.5)
NEUTROPHILS: 39.6 %
NON-HDL CHOLESTEROL: 147 MG/DL (CALC)
PLATELET COUNT: 299 THOUSAND/UL (ref 140–400)
POTASSIUM: 4.1 MMOL/L (ref 3.5–5.3)
PROTEIN, TOTAL: 7.3 G/DL (ref 6.1–8.1)
RDW: 12.7 % (ref 11–15)
RED BLOOD CELL COUNT: 4.55 MILLION/UL (ref 3.8–5.1)
SODIUM: 139 MMOL/L (ref 135–146)
TRIGLYCERIDES: 89 MG/DL
TSH: 0.25 MIU/L
VITAMIN D, 25-OH, TOTAL: 26 NG/ML (ref 30–100)
WHITE BLOOD CELL COUNT: 5.9 THOUSAND/UL (ref 3.8–10.8)

## 2023-08-25 ENCOUNTER — HOSPITAL ENCOUNTER (OUTPATIENT)
Dept: MAMMOGRAPHY | Facility: HOSPITAL | Age: 38
Discharge: HOME | End: 2023-08-25
Attending: FAMILY MEDICINE
Payer: COMMERCIAL

## 2023-08-25 DIAGNOSIS — R92.2 INCONCLUSIVE MAMMOGRAM: ICD-10-CM

## 2023-08-25 PROCEDURE — 77065 DX MAMMO INCL CAD UNI: CPT | Performed by: FAMILY MEDICINE

## 2023-08-25 PROCEDURE — 77061 BREAST TOMOSYNTHESIS UNI: CPT | Performed by: FAMILY MEDICINE

## 2023-08-25 PROCEDURE — 76642 ULTRASOUND BREAST LIMITED: CPT | Performed by: FAMILY MEDICINE

## 2023-12-05 NOTE — PROGRESS NOTES
[facility]  Progress Note    Lukas Rhoades is a 34year old female. Chief Complaint   Patient presents with    New Patient     Right heel pain, at the end of Sept patient was moving into a new house. She denies any trauma. She rates pain 10/10 when weightbearing, she does notice tingling at the back of the heel. There is some swelling. She is now noticing pain about 2 days ago, 3/10. Xrays of the right heel on 10/20/23. HPI:     Patient is a pleasant 24-year-old female who is presenting to clinic today with complaints of bilateral heel pain (right worse than left). Patient has been experiencing right heel pain for the past couple months. She relates it to a move that she recently had. She rates the pain 10/10 and is causing her to limp. She states that she is now trying to avoid all weight to the heel. She also noticed about 2 days ago that her left heel is now starting to bother her, currently rating it 3/10. She does wear slippers in the house. Denies any injuries. She relates some minor swelling to the inside of the right rear foot. No other concerns today. She is here today for further evaluation and care. Denies recent nausea, vomiting, fever, chills. Allergies: Patient has no known allergies. Current Outpatient Medications   Medication Sig Dispense Refill    predniSONE 10 MG Oral Tab Take 1 tablet (10 mg total) by mouth 2 (two) times daily for 14 days. Take one in the morning and one at lunch and with food.  28 tablet 0    levothyroxine 88 MCG Oral Tab Take 1 tablet (88 mcg total) by mouth before breakfast. 90 tablet 1      Past Medical History:   Diagnosis Date    Hypothyroidism       Past Surgical History:   Procedure Laterality Date       &       Family History   Problem Relation Age of Onset    Thyroid Disorder Father     Thyroid Disorder Mother     Diabetes Mother     Diabetes Maternal Grandmother     Diabetes Paternal Grandfather       Social History     Socioeconomic History    Marital status:      Spouse name: Raquel Yoon    Number of children: 2   Tobacco Use    Smoking status: Never    Smokeless tobacco: Never   Substance and Sexual Activity    Alcohol use: Never    Drug use: Never    Sexual activity: Yes     Partners: Male   Other Topics Concern    Caffeine Concern No    Exercise Yes    Seat Belt No    Special Diet No    Stress Concern No    Weight Concern No           REVIEW OF SYSTEMS:     10 point ROS completed and was negative, except for pertinent positive and negatives stated in subjective. EXAM:     GENERAL: well developed, well nourished, in no apparent distress  EXTREMITIES:  1. Integument: Skin appears moist, warm, and supple with positive hair growth. There are no color changes. No open lesions. No macerations. No Hyperkeratotic lesions. 2. Vascular: Dorsalis pedis 2/4 bilateral and posterior tibial pulses 2/4 bilateral, capillary refill normal.  Mild localized edema noted to plantar medial aspect of bilateral rear foot. 3. Neurological: Gross sensation intact via light touch bilaterally. Normal sharp/dull sensation  4. Musculoskeletal: All muscle groups are graded 5/5 in the foot and ankle. Pain with palpation to plantar medial aspect of bilateral heels (right worse than left). Pain with side-to-side heel squeeze of the right lower extremity. Decreased ankle joint dorsiflexion with the knee extended and that slightly improves with the knee flexed, consistent with equinus deformity bilaterally. Antalgic gait noted. ASSESSMENT AND PLAN:   Diagnoses and all orders for this visit:    Bilateral plantar fasciitis  -     predniSONE 10 MG Oral Tab; Take 1 tablet (10 mg total) by mouth 2 (two) times daily for 14 days. Take one in the morning and one at lunch and with food.     Heel pain, bilateral    Edema of right foot    Gastrocnemius equinus of right lower extremity    Gastrocnemius equinus of left lower extremity    Antalgic gait        Plan:   -Patient was seen and evaluated today in clinic.    -I have reviewed patient's chart, clinical findings and diagnosis related to condition with the patient in detail.  -Basic mechanical principles reviewed. Discussed potential etiology and treatment options. -Explained that the plantar fascia is a connective tissue/ligament on the bottom of the foot.   -This is an overuse injury and progress is often slow/gradual.   -Discussed importance of managing the inflammation and biomechanical issues as well as the importance of adhering to the conservative treatment instructions given. -Pt educated and given a handout on proper footwear and importance of supportive shoes.   -Pt demonstrated and given handout with recommended home stretching routine.    -I have recommended OTC supplemental arch supports such as Spenco/Powerstep/Redithotics prefab orthotics. Can look into custom orthotic options in the future.  -We discussed options for OTC vs. Prescriptions NSAID's and GI precautions reviewed. -Recommendations for ice/gel pack to affected area 2-3 times daily and especially after activity or when symptoms are most prevalent. -Modify activity according to tolerance of pain/symptoms.  -Additionally, cortisone injections, physical therapy, and immobilization can be considered.  -Surgical intervention may be considered if all conservative measures fail to resolve patient's symptoms. Injections series: deferred today. -Due to patient currently having difficulty with normal gait, I do recommend offloading in short cam boot to the right lower extremity at this time. Patient will be weightbearing as tolerated. She will be picking her cam boot from our [place] office today.     -Rx: Prednisone 10 mg twice daily for 14 days    -We will look into formal therapy if patient does not improve next visit.    -The patient indicates understanding of these issues and agrees to the plan.    Time spent reviewing pertinent information from patient's chart, reviewing any pertinent imaging, obtaining history and physical exam, and discussing and mutually agreeing on a treatment plan: 30 minutes    RTC 3 weeks      Solange Fuentes        12/5/2023    Dragon speech recognition software was used to prepare this note. Errors in word recognition may occur. Please contact me with any questions/concerns with this note.

## 2024-01-05 NOTE — TELEPHONE ENCOUNTER
From: Pedro Patton  To: Barrett Ashby  Sent: 1/5/2024 11:35 AM CST  Subject: PT Referral     Good morning Dr. Ashby,    End of the clinic visit you have advised for Physical Therapy. I haven’t received the prescription for that yet. Kindly, please send the prescription/referral. Thank you!

## 2024-01-08 NOTE — PROGRESS NOTES
[facility]  Progress Note    Pedro Patton is a 38 year old female.   Chief Complaint   Patient presents with    Foot Pain     Patient is here to follow up on right heel pain. She did get new shoes, otc insoles and completed oral meds with success. She denies any pain in the clinic today.         HPI:     Patient is a pleasant 38-year-old female is returning to clinic today for recheck of bilateral plantar fasciitis.  Patient states that her left foot has improved, but continues to have some minor pain to her right heel.  Relates overall improvements.  She does admit to continued to limp because of the pain.  She states that she is more worried that the pain is little worsen if she puts weight on her heel.  She has bought new supportive shoe gear as well as over-the-counter insoles that we recommended.  Patient did take the prednisone as prescribed and believes that this helped as well.  She has been doing some stretching at home.  Denies recent injury or other complaints at this time is here for further evaluation care.  Denies recent nausea, vomiting, fever, chills.      Allergies: Patient has no known allergies.   Current Outpatient Medications   Medication Sig Dispense Refill    levothyroxine 88 MCG Oral Tab Take 1 tablet (88 mcg total) by mouth before breakfast. 90 tablet 1      Past Medical History:   Diagnosis Date    Hypothyroidism       Past Surgical History:   Procedure Laterality Date       &       Family History   Problem Relation Age of Onset    Thyroid Disorder Father     Thyroid Disorder Mother     Diabetes Mother     Diabetes Maternal Grandmother     Diabetes Paternal Grandfather       Social History     Socioeconomic History    Marital status:      Spouse name: Abdi    Number of children: 2   Tobacco Use    Smoking status: Never    Smokeless tobacco: Never   Substance and Sexual Activity    Alcohol use: Never    Drug use: Never    Sexual activity: Yes      Partners: Male   Other Topics Concern    Caffeine Concern No    Exercise Yes    Seat Belt No    Special Diet No    Stress Concern No    Weight Concern No           REVIEW OF SYSTEMS:     10 point ROS completed and was negative, except for pertinent positive and negatives stated in subjective.       EXAM:     GENERAL: well developed, well nourished, in no apparent distress  EXTREMITIES:  1. Integument: Skin appears moist, warm, and supple with positive hair growth. There are no color changes. No open lesions. No macerations. No Hyperkeratotic lesions.   2. Vascular: Dorsalis pedis 2/4 bilateral and posterior tibial pulses 2/4 bilateral, capillary refill normal.  Mild localized edema noted to plantar medial aspect of bilateral rear foot.  3. Neurological: Gross sensation intact via light touch bilaterally.  Normal sharp/dull sensation  4. Musculoskeletal: All muscle groups are graded 5/5 in the foot and ankle.  Pain with palpation to plantar medial aspect of right heel.  No pain with palpation to left plantar medial heel today.  Minimal pain with side-to-side heel squeeze of the right lower extremity.  Decreased ankle joint dorsiflexion with the knee extended and that slightly improves with the knee flexed, consistent with equinus deformity bilaterally.  Antalgic gait noted.         ASSESSMENT AND PLAN:   Diagnoses and all orders for this visit:    Bilateral plantar fasciitis  -     OP REFERRAL TO [facility]    Heel pain, bilateral  -     OP REFERRAL TO [facility]    Edema of right foot  -     OP REFERRAL TO [facility]    Gastrocnemius equinus of right lower extremity  -     OP REFERRAL TO [facility]    Gastrocnemius equinus of left lower extremity  -     OP REFERRAL TO [facility]    Antalgic gait  -     OP REFERRAL TO [facility]        Plan:   -Patient was seen and evaluated today in  clinic.    -Patient overall improved, but does have continued pain to the right heel with antalgic gait, consistent with continued plantar fasciitis of right lower extremity.    -Discussed further treatment options today.  I do recommend patient try formal physical therapy at this time.  Patient is in agreement.    -Referral placed today for formal physical therapy.  Also recommend patient performing exercises at home daily.    -Recommendations for ice/gel pack to affected area 2-3 times daily and especially after activity or when symptoms are most prevalent.     -Modify activity according to tolerance of pain/symptoms.    -Recommend patient continue wearing supportive shoe gear with her over-the-counter inserts daily.    -Patient defers injection today.  Will look into 1 in the future if patient continues to have pain.  Will also consider moving forward with an MRI of patient's does not improve with formal physical therapy.    -The patient indicates understanding of these issues and agrees to the plan.    Time spent reviewing pertinent information from patient's chart, reviewing any pertinent imaging, obtaining history and physical exam, and discussing and mutually agreeing on a treatment plan: 20 minutes    RTC 4 weeks      Barrett Ashby DPM        1/8/2024    Dragon speech recognition software was used to prepare this note.  Errors in word recognition may occur.  Please contact me with any questions/concerns with this note.

## 2024-01-23 NOTE — PROGRESS NOTES
LOWER EXTREMITY EVALUATION:     Diagnosis:       Bilateral plantar fasciitis (M72.2)  Heel pain, bilateral (M79.671,M79.672)  Edema of right foot (R60.0)  Gastrocnemius equinus of right lower extremity (M62.461)  Gastrocnemius equinus of left lower extremity (M62.462)  Antalgic gait (R26.89   Referring Provider: Isma  Date of Evaluation:    1/23/2024    Precautions:  None Next MD visit:   none scheduled  Date of Surgery: n/a     PATIENT SUMMARY   Pedro Patton is a 38 year old female who presents to therapy today with complaints of pain in both heels. The right started first a few months ago, the L one soon started to bother her as well. She did not do a lot on her feet during primitivo break and was feeling better since then. She does feel like she is still limping on the R to avoid placing weight on her heel. She does still have some discomfort. She is feeling like her legs in general are weak from not doing much, fatigues in standing after 15 mins.   Pt describes pain level current  R 2-3  L 1/10   Current functional limitations include standing for prolonged periods of time and walking with normalized gait, pain free in B heels .     Pedro describes prior level of function I. Pt goals include to increase the strength in her legs and feel like she can walk normal .  Past medical history was reviewed with Pedro. Significant findings include    Past Medical History:   Diagnosis Date    Hypothyroidism         ASSESSMENT  Pedro presents to physical therapy evaluation with primary c/o pain in both heels . The results of the objective tests and measures show  loss of ankle ROM and LE strength overall .  Functional deficits include but are not limited to walking an standing for prolonged periods of time.  Signs and symptoms are consistent with diagnosis of B plantar fasciitis . Pt and PT discussed evaluation findings, pathology, POC and HEP.  Pt voiced understanding and performs HEP correctly  without reported pain. Skilled Physical Therapy is medically necessary to address the above impairments and reach functional goals.     OBJECTIVE:   Observation: antalgic gait, lack of heel strike and toe off   Palpation:  + R heel . ,medial arch area. L no as tender today   Sensation: she reports some \"tingling\" around the Heel on the R , abolished when she places weight on her heel     AROM: (* denotes performed with pain)  Hip Knee Foot/Ankle   Flexion: R WFL; L WFL  Extension:; decreased with ambulation    Flexion: R 120; L 120  Extension: R 0; L 0    DF: R 18; L 10  PF: R 45; L 45  INV: R WFL; L WFL  EV: R 2WFL; L WFL  Great toe ext: R 60; L 60    PROM: DF R 25 degs  L 18 degs      Accessory motion:  subtalar tightness noted B min    Flexibility:  Hip Flexor: R min, L min  Hamstrings: R min; L min  Piriformis: R min; L min  Quads: R min; L min  Gastroc-soleus: R mod; L mod    Strength/MMT: (* denotes performed with pain)  Hip Knee Foot/Ankle   NA NA    NA     Special tests:   NA    Gait: pt ambulates on level ground with antalgia, decreased heel to toe gait   Balance: SLS: R NA sec, L Na sec    Today’s Treatment and Response:  STM to B heels today- taped heel to cup and add some support with KT. Wear shoes at home  She is wearing gym shoes outside and inside the home with an orthotic     Pt education was provided on exam findings, treatment diagnosis, treatment plan, expectations, and prognosis. Pt was also provided recommendations for possible soreness after evaluation.  Patient was instructed in and issued a HEP for:  gastroc and soleus stretch  Tennis ball for rolling  Ice as needed  Heel raises 10 x     Charges: PT Eval Low Complexity, MT 1      Total Timed Treatment: 45 min     Total Treatment Time: 45 min     Based on clinical rationale and outcome measures, this evaluation involved Low Complexity decision making due to 1-2 personal factors/comorbidities, 3 body structures involved/activity limitations,  and evolving symptoms including changing pain levels.  PLAN OF CARE:    Goals: (to be met in 8 visits)  Pt to be I with HEP  Assess balance next appt  Pt to demo full DF for heel strike ambulation  Pt will report standing for 30 mins without pain in her heels or the LE's  Pt will ascend and descend stairs reciprocally no pain in heels  Pt will be able to negotiate community distances, curbs without difficulty     Frequency / Duration: Patient will be seen for 2 x/week or a total of 8 visits over a 90 day period. Treatment will include: Manual Therapy for heels and plantar fascia. Increase balance , improve gait, increase LE strength     Education or treatment limitation: None  Rehab Potential:good    FES Score  No data recorded  No data recorded    Patient/Family/Caregiver was advised of these findings, precautions, and treatment options and has agreed to actively participate in planning and for this course of care.    Thank you for your referral. Please co-sign or sign and return this letter via fax as soon as possible to [phone]. If you have any questions, please contact me at Dept: [phone]    Sincerely,  Electronically signed by therapist: Briana Buchanan, PT  Physician's certification required: Yes  I certify the need for these services furnished under this plan of treatment and while under my care.    X___________________________________________________ Date____________________    Certification From: 1/23/2024  To:4/22/2024

## 2024-01-30 NOTE — PROGRESS NOTES
Diagnosis:   Bilateral plantar fasciitis (M72.2)  Heel pain, bilateral (M79.671,M79.672)  Edema of right foot (R60.0)  Gastrocnemius equinus of right lower extremity (M62.461)  Gastrocnemius equinus of left lower extremity (M62.462)  Antalgic gait (R26.89      Referring Provider: Isma  Date of Evaluation:    1/23/24    Precautions:  None Next MD visit:   none scheduled  Date of Surgery: n/a   Insurance Primary/Secondary: [employer] / N/A     # Auth Visits: 8            Subjective: taped stayed on until Sunday. Today both feet are feeling better    Pain:  0 /10  R 1-2 today achilles        Objective:  DF to 20 R today AROM   Minimal discomfort R achilles attachment area on calcaneus with STM  AROM: (* denotes performed with pain)  Hip Knee Foot/Ankle   Flexion: R WFL; L WFL  Extension:; decreased with ambulation     Flexion: R 120; L 120  Extension: R 0; L 0    DF: R 18; L 10  PF: R 45; L 45  INV: R WFL; L WFL  EV: R 2WFL; L WFL  Great toe ext: R 60; L 60     PROM: DF R 25 degs  L 18 degs            Assessment:  pt came in feeling better in the R foot, no pain L achilles/ plantar heel pain.  Progressed to strengthening today no increase in symptoms . 0/10 R heel upon leaving today, pt is wearing gym shoes in the house for support.       Goals:   Pt to be I with HEP  Assess balance next appt  Pt to demo full DF for heel strike ambulation  Pt will report standing for 30 mins without pain in her heels or the LE's  Pt will ascend and descend stairs reciprocally no pain in heels  Pt will be able to negotiate community distances, curbs without difficulty        Plan: cont with PT for strengthening- reformer for squats and PF next appt  Assess effects of the tape on the R for increased arch support  Pt will transition over to therapist CATINA Chan  Date: 1/30/2024  TX#: 2/8 Date:                 TX#: 3/ Date:                 TX#: 4/ Date:                 TX#: 5/ Date:   Tx#: 6/   STM and tool R achilles 6 mins   KT to  increase arch support   Bridge with DF 10 x   Clams 10 x RTB 2 sets  Heel raises in standing 10 x 2  Reformer 5 cords 10 x 2  Gastroc and soleus stretch in standing 3 x 30secs                               HEP:  see above    Charges: EX3       Total Timed Treatment: 45 min  Total Treatment Time: 45 min

## 2024-01-31 NOTE — PROGRESS NOTES
[facility]  Progress Note    Pedro Patton is a 38 year old female.   Chief Complaint   Patient presents with    Follow - Up     Right heel pain- pt states she completed 2 wks of PT and has seen improvement. Pt rates her pain 1/10.          HPI:     Patient is a pleasant 38-year-old female who is returning to clinic today for recheck on bilateral plantar fasciitis (right worse than left).  Patient has started physical therapy and has been going for 2 weeks.  She has noticed significant improvements.  She currently has KT tape to her right foot.  She states that the tape has helped.  She is also ambulating in new supportive shoe gear and ready ptotic inserts.  Rates her pain today 1/10.  She also states that she is no longer limping when she walks.  Overall, patient is doing much better and is denying any new concerns.  Denies recent nausea, vomiting, fever, chills.      Allergies: Patient has no known allergies.   Current Outpatient Medications   Medication Sig Dispense Refill    levothyroxine 88 MCG Oral Tab Take 1 tablet (88 mcg total) by mouth before breakfast. 90 tablet 1      Past Medical History:   Diagnosis Date    Hypothyroidism       Past Surgical History:   Procedure Laterality Date       &       Family History   Problem Relation Age of Onset    Thyroid Disorder Father     Thyroid Disorder Mother     Diabetes Mother     Diabetes Maternal Grandmother     Diabetes Paternal Grandfather       Social History     Socioeconomic History    Marital status:      Spouse name: Abdi    Number of children: 2   Tobacco Use    Smoking status: Never    Smokeless tobacco: Never   Substance and Sexual Activity    Alcohol use: Never    Drug use: Never    Sexual activity: Yes     Partners: Male   Other Topics Concern    Caffeine Concern No    Exercise Yes    Seat Belt No    Special Diet No    Stress Concern No    Weight Concern No           REVIEW OF SYSTEMS:     10 point ROS  completed and was negative, except for pertinent positive and negatives stated in subjective.       EXAM:     GENERAL: well developed, well nourished, in no apparent distress  EXTREMITIES:  1. Integument: Skin appears moist, warm, and supple with positive hair growth. There are no color changes. No open lesions. No macerations. No Hyperkeratotic lesions.   2. Vascular: Dorsalis pedis 2/4 bilateral and posterior tibial pulses 2/4 bilateral, capillary refill normal.  No longer localized edema noted to plantar medial aspect of bilateral rear foot.  3. Neurological: Gross sensation intact via light touch bilaterally.  Normal sharp/dull sensation  4. Musculoskeletal: All muscle groups are graded 5/5 in the foot and ankle.  No pain with palpation to plantar medial and central aspects of right heel.  No pain with palpation to left plantar medial heel today.  No pain with side-to-side heel squeeze of the right lower extremity.  Improved ankle joint dorsiflexion with the knee extended and that improves with the knee flexed, consistent with equinus deformity bilaterally.  No antalgic gait noted today.      ASSESSMENT AND PLAN:   Diagnoses and all orders for this visit:    Bilateral plantar fasciitis    Heel pain, bilateral    Gastrocnemius equinus of right lower extremity    Gastrocnemius equinus of left lower extremity        Plan:   -Patient was seen and evaluated today in clinic.      -Patient is doing very well overall with minimal pain today.  Benign exam    -Recommend continued use of supportive shoe gear and over-the-counter inserts.    -Patient will finish out physical therapy and I recommend that she continue performing her exercises at home    -Patient should rest, ice, and elevate her extremity if any flareups do occur.    -Patient can return to all activities as tolerated    -Instructed patient to contact my office with any flareups or new issues.    -The patient indicates understanding of these issues and agrees  to the plan.    Time spent reviewing pertinent information from patient's chart, reviewing any pertinent imaging, obtaining history and physical exam, discussing and mutually agreeing on a treatment plan, and documenting encounter: 15 minutes    RTC as needed      Barrett Ashby DPM        1/31/2024    Dragon speech recognition software was used to prepare this note.  Errors in word recognition may occur.  Please contact me with any questions/concerns with this note.

## 2024-02-07 NOTE — PROGRESS NOTES
Diagnosis:   Bilateral plantar fasciitis (M72.2)  Heel pain, bilateral (M79.671,M79.672)  Edema of right foot (R60.0)  Gastrocnemius equinus of right lower extremity (M62.461)  Gastrocnemius equinus of left lower extremity (M62.462)  Antalgic gait (R26.89      Referring Provider: No ref. provider found  Date of Evaluation:    1/23/24    Precautions:  None Next MD visit:   none scheduled  Date of Surgery: n/a   Insurance Primary/Secondary: [employer] / N/A     # Auth Visits: 8            Subjective: taped stayed on until Tuesday. Patient removed.  Today both feet are feeling better. Patient reports that she notices the difference with and without the taping.    Pain:  1/10  R calcaneus at achilles attachment         Objective:   Minimal discomfort R achilles attachment area on calcaneus with STM          Assessment:  pt came in feeling better in the R foot, mild pain at the calcaneus, no pain L achilles/ plantar heel pain.  Continued to progress strengthening exercises with  no increase in symptoms. No pain with completion of all exercises after taping except tandem ambulation. Patient reports slight pain at the attachment of R achilles. patient has returned to wearing slides/slippers in the house with the tape and no reports of pain.       Goals:   Pt to be I with HEP  Assess balance next appt  Pt to demo full DF for heel strike ambulation  Pt will report standing for 30 mins without pain in her heels or the LE's  Pt will ascend and descend stairs reciprocally no pain in heels  Pt will be able to negotiate community distances, curbs without difficulty        Plan: cont with PT for strengthening- squats, stairs      Date: 1/30/2024  TX#: 2/8 Date: 2/7/2024             TX#: 3/8 Date:                 TX#: 4/ Date:                 TX#: 5/ Date:   Tx#: 6/   STM and tool R achilles 6 mins   KT to increase arch support   Bridge with DF 10 x   Clams 10 x RTB 2 sets  Heel raises in standing 10 x 2  Reformer 5 cords 10 x  2  Gastroc and soleus stretch in standing 3 x 30secs    TherEx  KT to increase arch support   Bridge with DF 1 x 10  Supine bridges 1 x 10  SL hip abduction RTB 2 x 10  Gastroc and soleus stretch in standing 3 x 30secs  Standing heel raises 1 x 10  Reformer 5 cords 2 x 10  Reformer heel raises 1 x 10         Manual Therapy  STM and tool R achilles and plantar fascia 5 mins          Neuro Re-ed   NBOS on airex pad 3 x 30secs  Step ups onto airex pad 2 x 10  Tandem ambulation in parallel bars             HEP:  see above    Charges: TherEx 2 units Neuro re-ed 1 unit      Total Timed Treatment: 45 min  Total Treatment Time: 45 min

## 2024-02-12 NOTE — PROGRESS NOTES
Diagnosis:   Bilateral plantar fasciitis (M72.2)  Heel pain, bilateral (M79.671,M79.672)  Edema of right foot (R60.0)  Gastrocnemius equinus of right lower extremity (M62.461)  Gastrocnemius equinus of left lower extremity (M62.462)  Antalgic gait (R26.89      Referring Provider: No ref. provider found  Date of Evaluation:    1/23/24    Precautions:  None Next MD visit:   none scheduled  Date of Surgery: n/a   Insurance Primary/Secondary: [employer] / N/A     # Auth Visits: 8            Subjective: taped stayed on until Monday morning. Patient removed.  Today both feet are feeling better. Patient reports that she notices the difference with and without the taping.    Pain:  0/10         Objective: DF: R 20 L 14  Minimal discomfort R calcaneus with weightbearing without tape        Assessment: patient performed mat exercises and ambulation without taped heel. During exercises, there was no pain during ambulation and exercises, the patient expressed feeling a deep feeling in the heel. Once taped, further strengthening was completed. Completion of step up and overs on 6 inch step caused discomfort in the right heel during heel strike. Patient educated to continue over-emphasizing proper gait mechanics.       Goals:   Pt to be I with HEP MET  Pt to demo full DF for heel strike ambulation  Pt will report standing for 30 mins without pain in her heels or the LE's  Pt will ascend and descend stairs reciprocally no pain in heels  Pt will be able to negotiate community distances, curbs without difficulty        Plan: cont with PT for strengthening- squats, stair training      Date: 1/30/2024  TX#: 2/8 Date: 2/7/2024             TX#: 3/8 Date: 2/12/2024                 TX#: 4/8 Date:                 TX#: 5/ Date:   Tx#: 6/   STM and tool R achilles 6 mins   KT to increase arch support   Bridge with DF 10 x   Clams 10 x RTB 2 sets  Heel raises in standing 10 x 2  Reformer 5 cords 10 x 2  Gastroc and soleus stretch in standing 3  x 30secs    TherEx  KT to increase arch support   Bridge with DF 1 x 10  Supine bridges 1 x 10  SL hip abduction RTB 2 x 10  Gastroc and soleus stretch in standing 3 x 30secs  Standing heel raises 1 x 10  Reformer 5 cords 2 x 10  Reformer heel raises 1 x 10   TherEx  Bridge with DF 2 x 10  SL clamshells RTB 2 x 10 BLE  Gastroc and soleus stretch in standing 3 x 30 secs  Reformer 5 cords 2 x 10  Reformer heel raises 2 x 10  Step up and overs on 6 inch step 2 x 10        Manual Therapy  STM and tool R achilles and plantar fascia 5 mins    Manual Therapy  STM and tool R achilles and plantar fascia 5 mins       Neuro Re-ed   NBOS on airex pad 3 x 30secs  Step ups onto airex pad 2 x 10  Tandem ambulation in parallel bars Neuro Re-ed   Step ups on airex pad 2 x 10  Tandem ambulation in parallel bars  Tandem stance on airex pad 2 x 30 secs              HEP:  see above    Charges: TherEx 2 units Neuro re-ed 1 unit      Total Timed Treatment: 47 min  Total Treatment Time: 47 min

## 2024-02-19 NOTE — PROGRESS NOTES
Diagnosis:   Bilateral plantar fasciitis (M72.2)  Heel pain, bilateral (M79.671,M79.672)  Edema of right foot (R60.0)  Gastrocnemius equinus of right lower extremity (M62.461)  Gastrocnemius equinus of left lower extremity (M62.462)  Antalgic gait (R26.89      Referring Provider: No ref. provider found  Date of Evaluation:    1/23/24    Precautions:  None Next MD visit:   none scheduled  Date of Surgery: n/a   Insurance Primary/Secondary: [employer] / N/A     # Auth Visits: 8            Subjective: taped stayed on until Sunday morning. Patient removed. Went grocery shopping. No increase of heel pain without tape.    Pain:  0/10         Objective: palpation of R gastroc-soleus tightness and mild tenderness        Assessment: Patient presented to session with no reports of right heel pain. Tape was applied at the start of session following the STM, and all exercises were completed without pain. Step up and overs were completed on increased step height compared to previous session, and no reports of pain or discomfort. Continued to educate patient on importance of over-emphasizing initial heel strike during ambulation.          Goals:   Pt to be I with HEP MET  Pt to demo full DF for heel strike ambulation  Pt will report standing for 30 mins without pain in her heels or the LE's MET  Pt will ascend and descend stairs reciprocally no pain in heels MET  Pt will be able to negotiate community distances, curbs without difficulty MET       Plan: cont with PT for strengthening  Date: 1/30/2024  TX#: 2/8 Date: 2/7/2024             TX#: 3/8 Date: 2/12/2024                 TX#: 4/8 Date:2/19/2024               TX#: 5/8 Date:   Tx#: 6/   STM and tool R achilles 6 mins   KT to increase arch support   Bridge with DF 10 x   Clams 10 x RTB 2 sets  Heel raises in standing 10 x 2  Reformer 5 cords 10 x 2  Gastroc and soleus stretch in standing 3 x 30secs    TherEx  KT to increase arch support   Bridge with DF 1 x 10  Supine bridges 1  x 10  SL hip abduction RTB 2 x 10  Gastroc and soleus stretch in standing 3 x 30secs  Standing heel raises 1 x 10  Reformer 5 cords 2 x 10  Reformer heel raises 1 x 10   TherEx  Bridge with DF 2 x 10  SL clamshells RTB 2 x 10 BLE  Gastroc and soleus stretch in standing 3 x 30 secs  Reformer 5 cords 2 x 10  Reformer heel raises 2 x 10  Step up and overs on 6 inch step 2 x 10   TherEx - 30  Gastroc and soleus stretch in standing 3 x 30 sec each leg  Reformer 5 cords 2 x 10  Reformer heel raises 2 x 10  Standing single leg heel raises 2 x 10 each leg  Step up and overs on 8 inch step 2 x 10 each leg (focus on initial heel strike)     Manual Therapy  STM and tool R achilles and plantar fascia 5 mins    Manual Therapy  STM and tool R achilles and plantar fascia 5 mins  Manual Therapy 6 min  STM and tool R achilles, plantar fascia, and soleus     Neuro Re-ed   NBOS on airex pad 3 x 30secs  Step ups onto airex pad 2 x 10  Tandem ambulation in parallel bars Neuro Re-ed   Step ups on airex pad 2 x 10  Tandem ambulation in parallel bars  Tandem stance on airex pad 2 x 30 secs   Neuro Re-ed 10 min  Tandem ambulation in parallel bars  Tandem ambulation on airex pad  SLS on airex 2 x 30 sec each leg           HEP:  Gastroc/soleus stretch, double leg heel raises, single leg heel raises, squats    Charges: TherEx 2 units Neuro re-ed 1 unit      Total Timed Treatment: 46 min  Total Treatment Time: 46 min

## 2024-07-16 NOTE — TELEPHONE ENCOUNTER
REFILL PASSED PER [facility] PROTOCOLS    Requested Prescriptions   Pending Prescriptions Disp Refills    LEVOTHYROXINE 88 MCG Oral Tab [Pharmacy Med Name: Levothyroxine Sodium Oral Tablet 88 MCG] 90 tablet 0     Sig: TAKE ONE TABLET BY MOUTH DAILY BEFORE  BREAKFAST       Thyroid Medication Protocol Passed - 7/12/2024  2:14 PM        Passed - TSH in past 12 months        Passed - Last TSH value is normal     Lab Results   Component Value Date    TSH 1.78 10/14/2023    TSHT4 2.90 11/21/2022                 Passed - In person appointment or virtual visit in the past 12 mos or appointment in next 3 mos     Recent Outpatient Visits              4 months ago     [facility]    Office Visit    5 months ago     [facility] VanessaFish [place], PT    Office Visit    5 months ago     [facility]    [facility]    5 months ago Bilateral plantar fasciitis    [facility], [address], [place] Francesco Ashby, DPM    Office Visit    5 months ago     [facility] in [facility] Briana Buchanan, PT    Office Visit          Future Appointments         Provider Department Appt Notes    In 1 month Sheila Rutledge MD [facility], [address] [place] [address] Annual physical - last done 8-1-23                         Future Appointments         Provider Department Appt Notes    In 1 month Sheila Rutledge MD [facility], [address] [place] [address] Annual physical - last done 8-1-23          Recent Outpatient Visits              4 months ago     [facility] in [facility] Fish Mendez, PT    Office Visit    5 months ago     [facility] in [facility] Fish Mendez, PT    Office Visit    5 months ago     [facility]    Office Visit    5 months ago  Bilateral plantar fasciitis    [facility], [address], [place] Fierro, JANI    Office Visit    5 months ago     [facility] in [facility] [place] Briana Buchanan, PT    Office Visit

## 2024-09-04 NOTE — PROGRESS NOTES
Family Medicine Progress Note    Pedro Patton is a 39 year old female.  ASSESSMENT AND PLAN:  Pedro was seen today for physical.    Diagnoses and all orders for this visit:    Routine general medical examination at a health care facility  -     Assay, Thyroid Stim Hormone  -     Lipid Panel  -     Comp Metabolic Panel (14)  -     CBC With Differential With Platelet    Visit for screening mammogram  -     [facility] SUSAN 2D+3D SCREENING BILAT (CPT=77067/10225); Future    Vitamin D deficiency  -     Vitamin D, 25-Hydroxy    Screening for diabetes mellitus  -     HGB A1C [496] [Q]    Hypothyroidism, unspecified type controlled  -     levothyroxine 88 MCG Oral Tab; Take 1 tablet (88 mcg total) by mouth before breakfast.  -     continue the same dose of medication    Sciatica of right side        - advised PT, patient will call back with in network PT locations.        -  patient instructions on home stretching provided    Pyriformis syndrome, right        - advised PT        -  advised to start exercises at home.    Age appropriate anticipatory guidance reviewed  Health Maintenance   Topic Date Due    Annual Depression Screening  01/01/2024    Annual Physical  08/01/2024    Mammogram  08/25/2024    COVID-19 Vaccine (4 - 2023-24 season) 09/01/2024    Influenza Vaccine (1) 10/01/2024    Pap Smear  05/11/2025    DTaP,Tdap,and Td Vaccines (2 - Td or Tdap) 10/12/2026    Pneumococcal Vaccine: Birth to 64yrs  Aged Out         Follow up Return in about 1 year (around 9/4/2025), or if symptoms worsen or fail to improve, for annual.    Sheila Rutledge M.D     HPI:   Pedro Patton is a 39 year old female with history of hypothyroidism seen for her annual physical.  Pap done in 2022 was normal, normal menstrual cycle.  Does do breast self exam, mammogram done last year was normal, no family history of breast ca.    Healthy diet and exercises.    Compliant with her thyroid  medication, tolerating it well without any side effects. Denies weight change, intolerance to heat or cold, dry skin, hair loss, constipation, mood changes or irregular menstrual cycle.    Complaining of pain in the right buttock radiating down her right leg for the past 1 month, patient states when she was doing yard work stood up and felt a sharp pain in her lower back, was not able to stand, states went to [facility], x-ray was normal and was prescribed cyclobenzaprine, patient states pain is better but seems to get worse if she has been standing up for a while, is fine if she is sitting or lying down.    PAST MEDICAL HISTORY:     Past Medical History:    Hypothyroidism     PAST SURGICAL HISTORY:     Past Surgical History:   Procedure Laterality Date       &      ALLERGY:   No Known Allergies  MEDICATIONS:     Current Outpatient Medications   Medication Sig Dispense Refill    levothyroxine 88 MCG Oral Tab Take 1 tablet (88 mcg total) by mouth before breakfast. 90 tablet 3     FAMILY HISTORY:     Family History   Problem Relation Age of Onset    Thyroid Disorder Father     Thyroid Disorder Mother     Diabetes Mother     Diabetes Maternal Grandmother     Diabetes Paternal Grandfather      SOCIAL HISTORY:     Social History     Socioeconomic History    Marital status:      Spouse name: Abdi    Number of children: 2   Tobacco Use    Smoking status: Never    Smokeless tobacco: Never   Vaping Use    Vaping status: Never Used   Substance and Sexual Activity    Alcohol use: Never    Drug use: Never    Sexual activity: Yes     Partners: Male   Other Topics Concern    Caffeine Concern No    Exercise Yes    Seat Belt No    Special Diet No    Stress Concern No    Weight Concern No   Social History Narrative    Eats egg and fish and mostly vegetarian     REVIEW OF SYSTEMS:   Review of Systems   Constitutional:  Negative for appetite change, fatigue, fever and unexpected weight change.   HENT:   Negative for congestion, ear pain, hearing loss and sore throat.    Eyes:  Negative for discharge, redness and visual disturbance.   Respiratory:  Negative for cough, chest tightness and shortness of breath.    Cardiovascular:  Negative for chest pain and palpitations.   Gastrointestinal:  Negative for abdominal pain, blood in stool, constipation and nausea.   Endocrine: Negative for cold intolerance, heat intolerance and polyuria.   Genitourinary:  Negative for difficulty urinating, dysuria, frequency and urgency.   Musculoskeletal:  Negative for arthralgias, gait problem, joint swelling and myalgias.   Skin:  Negative for rash.   Allergic/Immunologic: Negative for food allergies.   Neurological:  Negative for dizziness, weakness, numbness and headaches.   Hematological:  Negative for adenopathy. Does not bruise/bleed easily.   Psychiatric/Behavioral:  Negative for dysphoric mood and sleep disturbance. The patient is not nervous/anxious.         PHYSICAL EXAM:   /82   Pulse 84   Temp 97.9 °F (36.6 °C) (Temporal)   Resp 18   Ht 5' 2.6\" (1.59 m)   Wt 145 lb (65.8 kg)   LMP 08/11/2024   SpO2 98%   BMI 26.01 kg/m²     Physical Exam  Constitutional:       General: She is not in acute distress.     Appearance: Normal appearance. She is well-developed and normal weight.   HENT:      Head: Normocephalic and atraumatic.      Right Ear: Tympanic membrane, ear canal and external ear normal.      Left Ear: Tympanic membrane, ear canal and external ear normal.      Nose: Nose normal.      Mouth/Throat:      Mouth: Mucous membranes are moist.      Pharynx: Oropharynx is clear.   Eyes:      Extraocular Movements: Extraocular movements intact.      Conjunctiva/sclera: Conjunctivae normal.      Pupils: Pupils are equal, round, and reactive to light.   Neck:      Thyroid: No thyromegaly.   Cardiovascular:      Rate and Rhythm: Normal rate and regular rhythm.      Pulses: Normal pulses.      Heart sounds: Normal heart  sounds. No murmur heard.  Pulmonary:      Effort: Pulmonary effort is normal. No respiratory distress.      Breath sounds: Normal breath sounds.   Chest:      Chest wall: No tenderness.   Breasts:     Right: Normal. No swelling (texture dense), inverted nipple, mass, nipple discharge, skin change or tenderness.      Left: Normal. No swelling (texture dense), inverted nipple, mass, nipple discharge, skin change or tenderness.   Abdominal:      General: Bowel sounds are normal. There is no distension.      Palpations: Abdomen is soft. There is no hepatomegaly, splenomegaly or mass.      Tenderness: There is no abdominal tenderness.      Hernia: No hernia is present.   Musculoskeletal:         General: Normal range of motion.      Cervical back: Normal range of motion and neck supple.      Lumbar back: No spasms, tenderness or bony tenderness. Normal range of motion. Negative right straight leg raise test and negative left straight leg raise test. No scoliosis.      Right lower leg: No edema.      Left lower leg: No edema.   Lymphadenopathy:      Cervical: No cervical adenopathy.      Upper Body:      Right upper body: No supraclavicular, axillary or pectoral adenopathy.      Left upper body: No supraclavicular, axillary or pectoral adenopathy.   Skin:     General: Skin is warm.      Findings: No rash.   Neurological:      General: No focal deficit present.      Mental Status: She is alert and oriented to person, place, and time.      Cranial Nerves: No cranial nerve deficit.      Sensory: No sensory deficit.      Motor: No weakness.      Coordination: Coordination normal.      Gait: Gait normal.      Deep Tendon Reflexes: Reflexes are normal and symmetric. Reflexes normal.   Psychiatric:         Mood and Affect: Mood normal.         Behavior: Behavior normal.         Thought Content: Thought content normal.         Judgment: Judgment normal.             The 21st Century Cures Act makes medical notes like these  available to patients in the interest of transparency. Please be advised this is a medical document. Medical documents are intended to carry relevant information, facts as evident, and the clinical opinion of the practitioner. The medical note is intended as peer to peer communication and may appear blunt or direct. It is written in medical language and may contain abbreviations or verbiage that are unfamiliar.     Sheila Rutledge M.D

## 2024-09-05 NOTE — TELEPHONE ENCOUNTER
Nonbillable note.  Patient was admitted after midnight      Summary:  Darien Camarena is a 85 y.o. male with a history of afib, CKD, CAD, CHF, COPD, GERD, DM, STEVEN on CPAP, lung cancer, HTN, HLD, was presented to the hospital with jaundice.  CT abdomen with distal common bile duct noncalcified calculi    Obstructive jaundice  Choledocholithiasis   Elevated LFT's  Alkaline phos 423, ast 74, alt 65, bili 8.1  CT abdomen pelvis there is debris or noncalcified calculi within the distal common bile duct.  Underwent ERCP with multiple stone extraction, stent placement.  Noted pus to be coming.  Started on Rocephin and Flagyl  General Surgery consulted for lap dg  IV fluids and as needed analgesia     CAD  A-fib with RVR status post Watchman device  Continue Cardizem drip for rate control  Transition to p.o. metoprolol from home meds when able to take oral  Status post Watchman device.  Not on anticoagulation  Cardiology following and recommended to start aspirin 81 mg daily     Type 2 diabetes   A1c 5.9%  SSI     Chronic urinary retention  Self caths 4 times daily     COPD  As needed DuoNebs    CKD stage III  Creatinine stable around baseline of 1.1     Dementia  Depression  CT head shows no acute intracranial findings   Family stated that patient is at baseline     Appt 1/23/2020  Refill a few or deny?     LOV 1/8/2019

## 2024-09-10 NOTE — TELEPHONE ENCOUNTER
From: Pedro Patton  To: Sheila Rutledge  Sent: 9/9/2024 11:56 AM CDT  Subject: PT Referral    Hello Dr. Sosa,    Kindly send the PT referral to [facility] which’s located on   [address] [place] [id number]  Thank you!    Regards,  Orestes

## 2024-09-10 NOTE — TELEPHONE ENCOUNTER
LOV 9/4/24 pasted below from LOV note     \"Sciatica of right side        - advised PT, patient will call back with in network PT locations.        -  patient instructions on home stretching provided\"      Pt called and stated she was advised by Dr. Rutledge to let her know where she wants to go for PT. Notified I do see that in LOV note and I will send request to Dr. Rutledge. Pt also stated she had mammogram done yesterday. Notified Dr. Rutledge has not review results yet and we will call/message once she reviews.     Dr. Rutledge - Pended PT referral

## 2024-09-12 NOTE — TELEPHONE ENCOUNTER
Triage call transferred.   Spoke with pt stating per [facility] has not received [facility] referral. Informed will re-fax @[phone] listed. Pt will f/u with [facility] and call back if any further issues. Pt verbalized understanding and agreed with POC.

## 2024-10-02 NOTE — TELEPHONE ENCOUNTER
Pt provided icepacks and blanket under R shoulder per MD. Sheila Rutledge MD  9/10/2024  3:49 PM CDT       Normal mammogram, patient aware of results, repeat in 1 year.

## 2024-10-02 NOTE — TELEPHONE ENCOUNTER
From: Pedro aPtton  To: Sheila Rutledge  Sent: 10/2/2024 2:57 PM CDT  Subject: Mammogram     Hello Dr. Rutledge,   I hope that you reviewed my mammogram test results. Please let me know if I need to do any further testing this time. Thank you!    Regards,  Orestes

## 2024-12-04 NOTE — TELEPHONE ENCOUNTER
Patient was undergoing treatment for lower back pain and was feeling well. Over holiday weekend she took a long car trip which triggered increased lower back pain. She has an appointment in 2 days to reassess.  She went to PT a few days ago and has another set of PT appointments scheduled. They gave her exercises and stretches to do at home. She still has an rx for muscle relaxers prescribed previously for lower back pain. Sedation precautions reviewed, advised patient ok to take as directed. Can also try alternating heat and ice. Ibuprofen or tylenol for pain. If any worsening symptoms, go to IC. Patient agreeable to plan.

## 2024-12-06 NOTE — PROGRESS NOTES
Family Medicine Progress Note  ASSESSMENT AND PLAN:  Pedro Patton is a 39 year old female who is here for:     Pedro was seen today for low back pain.    Diagnoses and all orders for this visit:    Acute right-sided low back pain with right-sided sciatica  -     methylPREDNISolone (MEDROL) 4 MG Oral Tablet Therapy Pack; As directed.  -     encouraged to continue physical therapy.  -     if pain worsens or is not better to follow up and will consider ordering further imaging.  -     avoid sitting for prolonged hours.  -     advised to lift ergonomically by bending her knees when she picks things of the floor.    Spasm of muscle  -     cyclobenzaprine 10 MG Oral Tab; Take 1 tablet (10 mg total) by mouth nightly for 15 days.        The patient indicates understanding of these issues and agrees to the plan.  Follow-Up: The patient is asked to return in Return in about 1 week (around 12/13/2024), or if symptoms worsen or fail to improve.  .     Sheila Rutledge MD   12/06/24      CC: Low Back Pain (Started back a week ago)    HPI:   Pedro Patton is a 39 year old female who presents for Low Back Pain (Started back a week ago)     Patient is seen today accompanied by her  complaining of low back pain on the right side that started around Thanksgiving weekend, patient states did travel by car and was sitting for prolonged periods of time, states the pain started 2 days before she traveled but progressively got worse, pain is a 10 at its worst.  Patient, worse with sitting, standing, bending or walking.  States has relief when she lays down.  Pain radiates down her right leg, at times has tingling and numbness.  Denies any bowel or bladder incontinence.    Patient was doing PT for her back pain and states it was beginning to feel better and pain was almost 0 before it started up again.  Has scheduled physical therapy sessions.    ALLERGY:     Allergies as of 12/06/2024    (No Known Allergies)      MEDICATIONS:     Current Outpatient Medications   Medication Sig Dispense Refill    levothyroxine 88 MCG Oral Tab Take 1 tablet (88 mcg total) by mouth before breakfast. 90 tablet 3      Past Medical History:    Hypothyroidism      Social History:  Social History     Socioeconomic History    Marital status:      Spouse name: Abdi    Number of children: 2   Tobacco Use    Smoking status: Never    Smokeless tobacco: Never   Vaping Use    Vaping status: Never Used   Substance and Sexual Activity    Alcohol use: Never    Drug use: Never    Sexual activity: Yes     Partners: Male   Other Topics Concern    Caffeine Concern No    Exercise Yes    Seat Belt No    Special Diet No    Stress Concern No    Weight Concern No   Social History Narrative    Eats egg and fish and mostly vegetarian        REVIEW OF SYSTEMS:   A comprehensive 10 point review of systems was completed.  Pertinent positives and negatives noted in the the HPI.      EXAM:   /62   Pulse 76   Temp 98 °F (36.7 °C) (Temporal)   Resp 18   Ht 5' 2.6\" (1.59 m)   Wt 147 lb (66.7 kg)   LMP 09/26/2024   SpO2 98%   BMI 26.37 kg/m²   GENERAL: well developed, well nourished,in no apparent distress  NECK: supple  LUNGS: clear to auscultation  CARDIO: RRR without murmur  BACK: no tenderness to palpation over the lumbar spine, spasm of lumbar paraspinal muscles with tenderness, ROM is limited, SLR is negative bilateral  NEURO: bilateral LE strength is normal, normal sensation, DTR 2+ and symmetrical bilateral      NOTE TO PATIENT: The 21st Century Cures Act makes clinical notes like these available to patients in the interest of transparency. Clinical notes are medical documents used by physicians and care providers to communicate with each other. These documents include medical language and terminology, abbreviations, and treatment information that may sound technical and at times possibly unfamiliar. In addition, at times, the verbiage may  appear blunt or direct. These documents are one tool providers use to communicate relevant information and clinical opinions of the care providers in a way that allows common understanding of the clinical context.      Sheila Rutledge MD    12/06/24 8:13 AM

## 2024-12-19 NOTE — TELEPHONE ENCOUNTER
Received call from pt with condition update. Patient was seen by Dr. Rutledge on 12/6 for back pain. Overall, pt feels like back pain is improving with physical therapy. Patient is having pain across whole back after sitting for long periods of time, asking if Dr. Rutledge can order imaging for her. Still with occasional radiating pain/tingling down R leg. Patient stated this seems to occur more when she does not \"listen to her body\" when having the pain.     Routing to Dr. Rutledge to update, pt asking for imaging. Back pain has improved with physical therapy, but still having pain when sitting for long periods of time.    Fall with Harm Risk

## 2024-12-19 NOTE — TELEPHONE ENCOUNTER
Called pt to inform as per PCP. Pt will call back to schedule appt once PT completed. No further questions or concerns. Pt verbalized understanding and agreed with POC.

## 2024-12-19 NOTE — TELEPHONE ENCOUNTER
Please have her finish PT and follow up to document persistent symptoms and then I can order imaging.

## 2025-01-15 NOTE — PROGRESS NOTES
.Family Medicine Progress Note  ASSESSMENT AND PLAN:  Pedro Patton is a 39 year old female who is here for:     Pedro was seen today for follow - up.    Diagnoses and all orders for this visit:    Chronic right-sided low back pain with right-sided sciatica  -     [facility] REFERRAL FOR NAVIGATION  -     patient symptoms have improved slightly with PT but her back pain is persistent with now having numbness in her RLE.  -     referred to spine center for further evaluation         The patient indicates understanding of these issues and agrees to the plan.  Follow-Up: The patient is asked to return     Sheila Rutledge MD   01/15/25      CC: Follow - Up    HPI:   Pedro Patton is a 39 year old female who presents for Follow - Up     Patient is seen for follow up of low back pain, sates did 7 sessions of PT, has another one next week,states it helped a little, when she started PT was not able to walk but now is able to walk but has pain with sitting and bending, states pain is a 7-8, cannot sleep at night when she has the pain, not taking any medication, states is using hot packs, stretching and walking and is wearing a lumbar support.  Last week felt numbness in her right leg down to her calf for 2 days and since than has had it on and off. Feels the numbness when she bends forward and stands up.  Denies any bowel or bladder incontinence but for the past 2-3 weeks feels like she has not completely voided and has to sit for a few more minutes.    Patient's symptoms of back pain started in August, patient states at that time after doing PT her symptoms resolved completely, took a trip right after and was sitting in the car for a prolonged period of time and the pain reoccurred in Dec.    ALLERGY:     Allergies as of 01/15/2025    (No Known Allergies)     MEDICATIONS:     Current Outpatient Medications   Medication Sig Dispense Refill    levothyroxine 88 MCG Oral Tab Take 1 tablet (88 mcg  Telephone Encounter by Bri Galvan RN at 06/21/17 10:50 AM     Author:  Bri Galvan RN Service:  (none) Author Type:  Registered Nurse     Filed:  06/21/17 10:50 AM Encounter Date:  6/21/2017 Status:  Signed     :  Bri Galvan RN (Registered Nurse)       From: Kinga Simons  To: Sylwia Schultz MD  Sent: 6/21/2017  6:39 AM CDT  Subject: Referral Request    Please tell Dr. Schultz that I need the referral letter for Dr. 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   I will need both the consultation and removal of the fibroma   done next week.    Thanks,  Kinga Simons       Revision History        Date/Time User Provider Type Action    > 06/21/17 10:50 AM Bri Galvan, RN Registered Nurse Sign    Attribution information within the note text is not available.             total) by mouth before breakfast. 90 tablet 3      Past Medical History:    Hypothyroidism      Social History:  Social History     Socioeconomic History    Marital status:      Spouse name: Abdi    Number of children: 2   Tobacco Use    Smoking status: Never    Smokeless tobacco: Never   Vaping Use    Vaping status: Never Used   Substance and Sexual Activity    Alcohol use: Never    Drug use: Never    Sexual activity: Yes     Partners: Male   Other Topics Concern    Caffeine Concern No    Exercise Yes    Seat Belt No    Special Diet No    Stress Concern No    Weight Concern No   Social History Narrative    Eats egg and fish and mostly vegetarian        REVIEW OF SYSTEMS:   A comprehensive 10 point review of systems was completed.  Pertinent positives and negatives noted in the the HPI.      EXAM:   /74   Pulse 88   Temp 97.9 °F (36.6 °C) (Temporal)   Resp 18   Ht 5' 2.6\" (1.59 m)   Wt 145 lb (65.8 kg)   LMP 01/06/2025   SpO2 98%   BMI 26.01 kg/m²   GENERAL: well developed, well nourished,in no apparent distress  LUNGS: clear to auscultation  CARDIO: RRR without murmur  BACK: tenderness to palpation over lumbar spine, + tenderness over the right SI joint, ROM is limited with flexion, SLR positive on the right  NEURO: normal gait, bilateral LE sensation is normal, DTR 2+ and symmetrical, strength is 5/5      NOTE TO PATIENT: The 21st Century Cures Act makes clinical notes like these available to patients in the interest of transparency. Clinical notes are medical documents used by physicians and care providers to communicate with each other. These documents include medical language and terminology, abbreviations, and treatment information that may sound technical and at times possibly unfamiliar. In addition, at times, the verbiage may appear blunt or direct. These documents are one tool providers use to communicate relevant information and clinical opinions of the care providers in a way  that allows common understanding of the clinical context.      Sheila Rutledge MD    01/15/25 11:20 AM       28.4

## 2025-01-15 NOTE — PROGRESS NOTES
Spine Center Referral Navigation Encounter Note    Referred by: Sheila Rutledge MD     Imaging: None   If imaging done at an external facility, instructed patient to bring disc of MRI to appointment.     Previously Seen Spine Care Providers: None    Referred to: FRANKIE Gonzalez in Pain Management     Information below is patient reported.     [facility]  Are you currently experiencing any of the following symptoms?      No    Is your condition due to an injury that occurred at work or is your care for this condition being coordinated by Worker's Compensation?      No    Are you looking for a second surgical opinion?      No    Have you had surgery on your spine (neck or back) in the last 12 months?      No    In the last several weeks, have you experienced weakness or balance issues that have caused falls or difficulty lifting your legs or feet (lower body) and/or weakness that has caused you to drop items or caused changes in handwriting (upper body)?      No    In the last 3 months, have you had spine injections AND physical therapy for your back or neck that did not improve your symptoms?      No    : Patient needs to be seen by non-surgical team. Does patient require a new visit or established visit?      New patient visit    Are you closer to [facility] or [facility]?      [facility]         1/15- [facility] for patient requesting call back.    1/15- Patient called back and scheduled an appointment.

## 2025-01-28 NOTE — PROGRESS NOTES
Patient: Pedro Patton  Medical Record Number: [id number]  Site: [place], [place]  Referring Physician:  No ref. provider found  PCP: Dr. Rutledge        Thank you very much for requesting this consultation. I had the opportunity to evaluate and initiate care for your patient today, as per your request.    HISTORY OF CHIEF COMPLAINT:      Pedro Patton is a 39 year old female, who complains of complaint of low back pain.  Patient had reported that her pain began in 2024 after a long car ride/trip..  She did not seek care from her primary care provider.  He was given a Medrol Dosepak and order for physical therapy.  She was also given Flexeril.  She has continued with physical therapy however her symptoms have not resolved and she is having numbness and tingling going into the right lower extremity.    VAS Pain Score: 4/10 minimum pain is a 1 out of 10 maximum pain is a 6 out of 10  Distribution of pain is all on the right side.  Patient's quality of pain is shooting sharp cramping aching with numbness and tingling and she feels that lifting and sitting too long aggravated her symptoms    Aggravating Factors: Relieving Factors:   Sitting, bending, walking but that has improved, cold Hot packs, stretching, lumbar supporet     Past Treatment Attempted/Patient’s Response:  Treatment up to this time has included:  Evaluation: PCP/PT  NSAIDS: Medrol Dosepak: Mild relief  Narcotic use: Not applicable  Physical therapy: currently enrolled: improved slightly but right lbp with RLE numbness persists  Spinal injections: Not applicable  Others: Not applicable        Past Medical History:    Hypothyroidism      Past Surgical History:   Procedure Laterality Date       &       Family History   Problem Relation Age of Onset    Thyroid Disorder Father     Thyroid Disorder Mother     Diabetes Mother     Diabetes Maternal Grandmother     Diabetes Paternal Grandfather        Social History     Socioeconomic History    Marital status:      Spouse name: Abdi    Number of children: 2   Tobacco Use    Smoking status: Never    Smokeless tobacco: Never   Vaping Use    Vaping status: Never Used   Substance and Sexual Activity    Alcohol use: Never    Drug use: Never    Sexual activity: Yes     Partners: Male   Other Topics Concern    Caffeine Concern No    Exercise Yes    Seat Belt No    Special Diet No    Stress Concern No    Weight Concern No      Current Medications:  Current Outpatient Medications   Medication Sig Dispense Refill    levothyroxine 88 MCG Oral Tab Take 1 tablet (88 mcg total) by mouth before breakfast. 90 tablet 3        Functional Assessment: Patient reports that they are able to complete all of their ADL's such as eating, bathing, using the toilet, dressing and getting up from a bed or a chair independently.    Work History:  The patient currently works as a tax prepare.    REVIEW OF SYSTEMS:   GENERAL HEALTH: No fevers, chills, recent weight loss or unremitting night pain.  SKIN: No history of skin rashes.  EYES: No blurred vision, double vision or changes in vision.  HEENT: No swallowing or hearing difficulties.  RESPIRATORY: No shortness of breath.   CARDIOVASCULAR: No chest pain or palpitations.   GI: Denies nausea, vomiting, constipation, diarrhea; no rectal bleeding; no heartburn, no melana.  : No dysuria, urgency or frequency; no hematuria.  NEURO: No balance problems, no seizure problems, no depression.  PSYCHE: no symptoms of depression or anxiety.  HEMATOLOGY: denies hx anemia; denies bruising or excessive bleeding.  ENDOCRINE: denies excessive thirst or urination; denies unexpected wt gain or wt loss.  History of hypothyroid, PCOS  MUSCULOSKELETAL: DENIES:, Muscle cramps, Joint pain, Swelling of joints, History of arthritis, Fibromyalgia, Osteoporosis, Hardware, Deformity, Limited Range of motion, Crepitation, Gout, Heel spurs positive back pain  right lower extremity pain numbness and tingling  ALLERGY/IMM.: denies food or seasonal allergies.  No history of cancer, infectious disease exposure.    Radiology/Lab Test Reviewed: No imaging to review     CBC:    Lab Results   Component Value Date    WBC 6.2 09/21/2024    WBC 5.9 08/15/2023    WBC 7.8 05/14/2022   No results found for: \"HEMOGLOBIN\"  Lab Results   Component Value Date     09/21/2024     08/15/2023     05/14/2022         PHYSICAL EXAMIMATION   PHYSICAL EXAMINATION: Pedro Patton is a 39 year old  female who is observed sitting  on a chair in the exam room alert and oriented times three. She looks consistent with her stated age.    Ht Readings from Last 1 Encounters:   01/15/25 62.6\"     Wt Readings from Last 1 Encounters:   01/15/25 145 lb (65.8 kg)     The patient is well developed, well nourished, normal body habitus, well muscled. She moves independently from sitting to standing with ease.   Patient is sitting in the chair off to the left due to pain    Inspection:  No acute distress    Patient displays Non-antalgic, and is able to toe walk with increased numbness tingling on the right foot with this activity, difficulty heel walking on the right.    Coordination:  Well-coordinated, fluent gait, able to engage in rapid alternating movements of upper and lower extremities.    ROM Lumbar Spine:  See chart below:  Motion Right (+ or -) Left (+ or -)   Lumbar Flexion + -   Lumbar Extension + -   Lumbar Bending + -   Lumbar Extension/ twisting motion + -     Lumbar/Sacral Integument:  Skin over lumbar sacral spine is intact without rashes, excoriations, lesions or erythema noted    Palpation:  See chart below:  Palpation of lumbar area Right (+ or -) Left (+ or -)   Lumbar facets + -   Lumbar paraspinals + -   Piriformis + -   SIJ - -   Trochanteric Bursa - -     Deep Tendon Reflexes:  Grossly intact to bilateral lower extremities 2/4  throughout    Strength:  Strength deficits noted:  right EHL 4+/5, otherwise 5/5     Sensation:  Sensory deficit noted:  Right lateral thigh right lateral calf top of right foot     Tests:  Test Right (+ or -) Left (+ or -)   SLR + -   Yann’s - -   Babinski - -   Clonus - -     HEAD/NECK: Head is normocephalic, neck supple  EYES: EOMI, HAROON  SKIN EXAM: Skin is intact, head, neck, trunk and arms/legs. No rashes, mottling or ulcerations.  LYMPH EXAM: There is no lymph edema in either lower extremity.  VASCULAR EXAM: Pedal pulses are normal bilaterally, with good distal perfusion. No clubbing or cyanosis.  ABDOMINAL EXAM: Abdomen is soft without masses palpated.  MUSCULOSKELETAL: Smooth, pain-free ROM to bilat hips, ankles, and knees.         Patient Education: Patient was advised to continue normal activities as tolerated and was advised against any form of bedrest, since recent guidelines promote and encourage physical activity for improvment of functionality and overall pain.  (Family Practice Vol. 16, No. 1, 07-88Â© [employer] 1999 ), Patient was shown interactive content, shown and explained procedure on spinal model..    Patient is currently on pain medications:  No  Reason pain medications are prescribed: N/A  Pain medications are prescribed by: N/A  [place] Prescription Monitoring review: N/A  DIRE: N/A  Treatment decision: N/A    MEDICAL DECISION MAKING:     Impression:     ICD-10-CM    1. Acute right-sided low back pain with right-sided sciatica  M54.41         Patient is a 39-year-old with 6-month history of right low back pain and right lower extremity radicular symptoms that have failed conservative treatment including 2 rounds of physical therapy, Medrol Dosepak, meloxicam, Flexeril, home exercise, lumbar support who presents to the clinic today for further evaluation and recommendations.    On exam patient has positive right straight leg raise, decreased sensation in the right lateral  thigh and right calf, 4+ out of 5 right EHL weakness, reduced ability to right toe walk and painful right heel walk.    Based on these findings, failure to improve with conservative treatment and ongoing symptoms with neuritis is recommended to obtain imaging of the lumbar spine including x-rays and MRI for possible transforaminal epidural steroid injection.    Risks and benefits of injection therapy were discussed with patient and her  today.  All questions were answered and patient does wish to move forward with this plan and is hopeful that we can treat her symptoms quickly as she has been trying to manage these for the last 6 months.  Patient does work as a tax prepare and she sits for her job which is a provoking activity and taxis and is coming up.  Patient and her  are both concerned.      Plan:   > Lumbar x-rays with flexion-extension views  > Obtain lumbar MRI  > After MRI completed we will review and determine treatment recommendations: Will contact patient to provide these recommendations  > Continue with a home exercise learned in physical therapy and any over-the-counter medications or treatment options to help reduce symptoms      The patient indicates understanding of these issues and agrees to the plan.      Thank you very much.             Id#[id number]

## 2025-01-28 NOTE — PATIENT INSTRUCTIONS
Refill policies:    Allow 2-3 business days for refills; controlled substances may take longer.  Contact your pharmacy at least 5 days prior to running out of medication and have them send an electronic request or submit request through the “request refill” option in your [facility] account.  Refills are not addressed on weekends; covering physicians do not authorize routine medications on weekends.  No narcotics or controlled substances are refilled after noon on Fridays or by on call physicians.  By law, narcotics must be electronically prescribed.  A 30 day supply with no refills is the maximum allowed.  If your prescription is due for a refill, you may be due for a follow up appointment.  To best provide you care, patients receiving routine medications need to be seen at least once a year.  Patients receiving narcotic/controlled substance medications need to be seen at least once every 3 months.  In the event that your preferred pharmacy does not have the requested medication in stock (e.g. Backordered), it is your responsibility to find another pharmacy that has the requested medication available.  We will gladly send a new prescription to that pharmacy at your request.    Scheduling Tests:    If your physician has ordered radiology tests such as MRI or CT scans, please contact Central Scheduling at [phone] right away to schedule the test.  Once scheduled, the [facility] Centralized Referral Team will work with your insurance carrier to obtain pre-certification or prior authorization.  Depending on your insurance carrier, approval may take 3-10 days.  It is highly recommended patients assure they have received an authorization before having a test performed.  If test is done without insurance authorization, patient may be responsible for the entire amount billed.      Precertification and Prior Authorizations:  If your physician has recommended that you have a procedure or additional testing performed the [facility]  Centralized Referral Team will contact your insurance carrier to obtain pre-certification or prior authorization.    You are strongly encouraged to contact your insurance carrier to verify that your procedure/test has been approved and is a COVERED benefit.  Although the [facility] Centralized Referral Team does its due diligence, the insurance carrier gives the disclaimer that \"Although the procedure is authorized, this does not guarantee payment.\"    Ultimately the patient is responsible for payment.   Thank you for your understanding in this matter.  Paperwork Completion:  If you require FMLA or disability paperwork for your recovery, please make sure to either drop it off or have it faxed to our office at [phone]. Be sure the form has your name and date of birth on it.  The form will be faxed to our Forms Department and they will complete it for you.  There is a 25$ fee for all forms that need to be filled out.  Please be aware there is a 10-14 day turnaround time.  You will need to sign a release of information (MARIETTA) form if your paperwork does not come with one.  You may call the Forms Department with any questions at [phone].  Their fax number is [phone].

## 2025-01-28 NOTE — PROGRESS NOTES
Subjective:   Patient ID: Pedro Patton is a 39 year old female.    HPI    History/Other:   Review of Systems  Current Outpatient Medications   Medication Sig Dispense Refill   • levothyroxine 88 MCG Oral Tab Take 1 tablet (88 mcg total) by mouth before breakfast. 90 tablet 3     Allergies:Allergies[1]    Objective:   Physical Exam  Constitutional:          Assessment & Plan:   No diagnosis found.    No orders of the defined types were placed in this encounter.      Meds This Visit:  Requested Prescriptions      No prescriptions requested or ordered in this encounter       Imaging & Referrals:  None    Location of Pain: right side low back, right lower leg    Date Pain Began: August 2024          Work Related:   No        Receiving Work Comp/Disability:   No    Numeric Rating Scale:  Pain at Present:  4/10                                                                                                            (No Pain) 0  to  10 (Worst Pain)                 Minimum Pain:   1  Maximum Pain  6    Distribution of Pain:    right    Quality of Pain:   aching, numbness, sharp/stabbing, and tingling    Origin of Pain:    Lifting    Aggravating Factors:    Cold, Sitting, and Other bending    Past Treatments for Current Pain Condition:   Physical Therapy    Prior diagnostic testing for your pain:  xray        [1] No Known Allergies

## 2025-01-29 NOTE — TELEPHONE ENCOUNTER
Patient called stating that she was seen on 1/28 and provider sent over an order for patient to get an xray & MRI done, which patient completed xray 1/28. Patient stated that there was no order for the MRI to be completed. Please call patient back at [phone]

## 2025-01-30 NOTE — TELEPHONE ENCOUNTER
Contacted pt at this time and advised that MRI has been placed . Per pt, she wants to have this completed at [facility]. Pt is scheduled for MRI on 2/7/2025.No further questions at this time.

## 2025-02-10 NOTE — TELEPHONE ENCOUNTER
Returned call to patient. States the last few months she has been having difficulty swallowing. Two weeks ago she started with pain/ pressure in chest. Food feels like it is getting stuck and not doing down completely. She is able to sill swallow food and water at this time. Denies coughing, SOB.     Discussed trying Omeprazole OTC. She has this at home. She is wondering if there is something to help with the chest pain? Hyoscyamine? Would you agree with EGD, dysphagia, MAC? Please advise.    Reviewed lumbar MRI.  Patient has a superimposed right paracentral disc narrowing the right subarticular recess encroaching the right L5.  Order placed for a right L4-5 transforaminal epidural steroid injection.  Message sent to patient

## 2025-02-13 NOTE — TELEPHONE ENCOUNTER
Contacted patient informed that insurance did not require pre certification for injection, offered to schedule. Per patient she would like to hold off on scheduling and will call back when she is ready.

## (undated) DIAGNOSIS — E03.9 HYPOTHYROIDISM, UNSPECIFIED TYPE: ICD-10-CM